# Patient Record
Sex: FEMALE | Race: BLACK OR AFRICAN AMERICAN | NOT HISPANIC OR LATINO | Employment: OTHER | URBAN - METROPOLITAN AREA
[De-identification: names, ages, dates, MRNs, and addresses within clinical notes are randomized per-mention and may not be internally consistent; named-entity substitution may affect disease eponyms.]

---

## 2018-11-27 ENCOUNTER — HOSPITAL ENCOUNTER (EMERGENCY)
Facility: HOSPITAL | Age: 68
Discharge: HOME | End: 2018-11-27
Attending: EMERGENCY MEDICINE | Admitting: EMERGENCY MEDICINE
Payer: MEDICARE

## 2018-11-27 ENCOUNTER — APPOINTMENT (EMERGENCY)
Dept: RADIOLOGY | Facility: HOSPITAL | Age: 68
End: 2018-11-27
Payer: MEDICARE

## 2018-11-27 VITALS
HEIGHT: 62 IN | WEIGHT: 164 LBS | HEART RATE: 65 BPM | DIASTOLIC BLOOD PRESSURE: 74 MMHG | SYSTOLIC BLOOD PRESSURE: 176 MMHG | RESPIRATION RATE: 19 BRPM | TEMPERATURE: 98.6 F | OXYGEN SATURATION: 99 % | BODY MASS INDEX: 30.18 KG/M2

## 2018-11-27 DIAGNOSIS — I49.8 SINUS ARRHYTHMIA: Primary | ICD-10-CM

## 2018-11-27 DIAGNOSIS — R05.9 COUGH: ICD-10-CM

## 2018-11-27 DIAGNOSIS — J06.9 UPPER RESPIRATORY INFECTION: ICD-10-CM

## 2018-11-27 LAB
ANION GAP SERPL CALCULATED.3IONS-SCNC: 7 MMOL/L (ref 4–13)
APTT PPP: 27 SECONDS (ref 26–38)
ATRIAL RATE: 60 BPM
BASOPHILS # BLD AUTO: 0.04 THOUSANDS/ΜL (ref 0–0.1)
BASOPHILS NFR BLD AUTO: 1 % (ref 0–1)
BUN SERPL-MCNC: 14 MG/DL (ref 5–25)
CALCIUM SERPL-MCNC: 9.3 MG/DL (ref 8.3–10.1)
CHLORIDE SERPL-SCNC: 106 MMOL/L (ref 100–108)
CO2 SERPL-SCNC: 29 MMOL/L (ref 21–32)
CREAT SERPL-MCNC: 0.7 MG/DL (ref 0.6–1.3)
EOSINOPHIL # BLD AUTO: 0.2 THOUSAND/ΜL (ref 0–0.61)
EOSINOPHIL NFR BLD AUTO: 4 % (ref 0–6)
ERYTHROCYTE [DISTWIDTH] IN BLOOD BY AUTOMATED COUNT: 13.2 % (ref 11.6–15.1)
GFR SERPL CREATININE-BSD FRML MDRD: 102 ML/MIN/1.73SQ M
GLUCOSE SERPL-MCNC: 113 MG/DL (ref 65–140)
HCT VFR BLD AUTO: 40.4 % (ref 34.8–46.1)
HGB BLD-MCNC: 13.1 G/DL (ref 11.5–15.4)
IMM GRANULOCYTES # BLD AUTO: 0.02 THOUSAND/UL (ref 0–0.2)
IMM GRANULOCYTES NFR BLD AUTO: 0 % (ref 0–2)
INR PPP: 0.99 (ref 0.86–1.17)
LIPASE SERPL-CCNC: 180 U/L (ref 73–393)
LYMPHOCYTES # BLD AUTO: 1.66 THOUSANDS/ΜL (ref 0.6–4.47)
LYMPHOCYTES NFR BLD AUTO: 30 % (ref 14–44)
MCH RBC QN AUTO: 29 PG (ref 26.8–34.3)
MCHC RBC AUTO-ENTMCNC: 32.4 G/DL (ref 31.4–37.4)
MCV RBC AUTO: 89 FL (ref 82–98)
MONOCYTES # BLD AUTO: 0.46 THOUSAND/ΜL (ref 0.17–1.22)
MONOCYTES NFR BLD AUTO: 8 % (ref 4–12)
NEUTROPHILS # BLD AUTO: 3.25 THOUSANDS/ΜL (ref 1.85–7.62)
NEUTS SEG NFR BLD AUTO: 57 % (ref 43–75)
NRBC BLD AUTO-RTO: 0 /100 WBCS
NT-PROBNP SERPL-MCNC: 173 PG/ML
P AXIS: 54 DEGREES
PLATELET # BLD AUTO: 171 THOUSANDS/UL (ref 149–390)
PMV BLD AUTO: 12.6 FL (ref 8.9–12.7)
POTASSIUM SERPL-SCNC: 4.1 MMOL/L (ref 3.5–5.3)
PR INTERVAL: 146 MS
PROTHROMBIN TIME: 13 SECONDS (ref 11.8–14.2)
QRS AXIS: 1 DEGREES
QRSD INTERVAL: 92 MS
QT INTERVAL: 412 MS
QTC INTERVAL: 431 MS
RBC # BLD AUTO: 4.52 MILLION/UL (ref 3.81–5.12)
SODIUM SERPL-SCNC: 142 MMOL/L (ref 136–145)
T WAVE AXIS: 27 DEGREES
TROPONIN I SERPL-MCNC: <0.02 NG/ML
VENTRICULAR RATE: 66 BPM
WBC # BLD AUTO: 5.63 THOUSAND/UL (ref 4.31–10.16)

## 2018-11-27 PROCEDURE — 71046 X-RAY EXAM CHEST 2 VIEWS: CPT

## 2018-11-27 PROCEDURE — 85610 PROTHROMBIN TIME: CPT | Performed by: EMERGENCY MEDICINE

## 2018-11-27 PROCEDURE — 85025 COMPLETE CBC W/AUTO DIFF WBC: CPT | Performed by: EMERGENCY MEDICINE

## 2018-11-27 PROCEDURE — 99285 EMERGENCY DEPT VISIT HI MDM: CPT

## 2018-11-27 PROCEDURE — 83690 ASSAY OF LIPASE: CPT | Performed by: EMERGENCY MEDICINE

## 2018-11-27 PROCEDURE — 36415 COLL VENOUS BLD VENIPUNCTURE: CPT | Performed by: EMERGENCY MEDICINE

## 2018-11-27 PROCEDURE — 93005 ELECTROCARDIOGRAM TRACING: CPT

## 2018-11-27 PROCEDURE — 84484 ASSAY OF TROPONIN QUANT: CPT | Performed by: EMERGENCY MEDICINE

## 2018-11-27 PROCEDURE — 93010 ELECTROCARDIOGRAM REPORT: CPT | Performed by: INTERNAL MEDICINE

## 2018-11-27 PROCEDURE — 83880 ASSAY OF NATRIURETIC PEPTIDE: CPT | Performed by: EMERGENCY MEDICINE

## 2018-11-27 PROCEDURE — 80048 BASIC METABOLIC PNL TOTAL CA: CPT | Performed by: EMERGENCY MEDICINE

## 2018-11-27 PROCEDURE — 85730 THROMBOPLASTIN TIME PARTIAL: CPT | Performed by: EMERGENCY MEDICINE

## 2018-11-27 RX ORDER — AZITHROMYCIN 250 MG/1
500 TABLET, FILM COATED ORAL ONCE
Status: COMPLETED | OUTPATIENT
Start: 2018-11-27 | End: 2018-11-27

## 2018-11-27 RX ORDER — AZITHROMYCIN 250 MG/1
250 TABLET, FILM COATED ORAL EVERY 24 HOURS
Qty: 4 TABLET | Refills: 0 | Status: SHIPPED | OUTPATIENT
Start: 2018-11-27 | End: 2018-12-01

## 2018-11-27 RX ADMIN — AZITHROMYCIN 500 MG: 250 TABLET, FILM COATED ORAL at 18:58

## 2018-11-27 NOTE — DISCHARGE INSTRUCTIONS
Acute Cough   WHAT YOU NEED TO KNOW:   An acute cough can last up to 3 weeks  Common causes of an acute cough include a cold, allergies, or a lung infection  DISCHARGE INSTRUCTIONS:   Return to the emergency department if:   · You have trouble breathing or feel short of breath  · You cough up blood, or you see blood in your mucus  · You faint or feel weak or dizzy  · You have chest pain when you cough or take a deep breath  · You have new wheezing  Contact your healthcare provider if:   · You have a fever  · Your cough lasts longer than 4 weeks  · Your symptoms do not improve with treatment  · You have questions or concerns about your condition or care  Medicines:   · Medicines  may be needed to stop the cough, decrease swelling in your airways, or help open your airways  Medicine may also be given to help you cough up mucus  Ask your healthcare provider what over-the-counter medicines you can take  If you have an infection caused by bacteria, you may need antibiotics  · Take your medicine as directed  Contact your healthcare provider if you think your medicine is not helping or if you have side effects  Tell him or her if you are allergic to any medicine  Keep a list of the medicines, vitamins, and herbs you take  Include the amounts, and when and why you take them  Bring the list or the pill bottles to follow-up visits  Carry your medicine list with you in case of an emergency  Manage your symptoms:   · Do not smoke and stay away from others who smoke  Nicotine and other chemicals in cigarettes and cigars can cause lung damage and make your cough worse  Ask your healthcare provider for information if you currently smoke and need help to quit  E-cigarettes or smokeless tobacco still contain nicotine  Talk to your healthcare provider before you use these products  · Drink extra liquids as directed  Liquids will help thin and loosen mucus so you can cough it up   Liquids will also help prevent dehydration  Examples of good liquids to drink include water, fruit juice, and broth  Do not drink liquids that contain caffeine  Caffeine can increase your risk for dehydration  Ask your healthcare provider how much liquid to drink each day  · Rest as directed  Do not do activities that make your cough worse, such as exercise  · Use a humidifier or vaporizer  Use a cool mist humidifier or a vaporizer to increase air moisture in your home  This may make it easier for you to breathe and help decrease your cough  · Eat 2 to 5 mL of honey 2 times each day  Honey can help thin mucus and decrease your cough  · Use cough drops or lozenges  These can help decrease throat irritation and your cough  Follow up with your healthcare provider as directed:  Write down your questions so you remember to ask them during your visits  © 2017 [employer] Information is for End User's use only and may not be sold, redistributed or otherwise used for commercial purposes  All illustrations and images included in CareNotes® are the copyrighted property of A D [employer] M [employer]  or Willam Sahu  The above information is an  only  It is not intended as medical advice for individual conditions or treatments  Talk to your doctor, nurse or pharmacist before following any medical regimen to see if it is safe and effective for you

## 2018-11-27 NOTE — ED PROVIDER NOTES
History  Chief Complaint   Patient presents with    Chest Pain     Pt presents to ER as a walk in from home for cough / cold symptoms x3 weeks associated with chest pains that started x1 week  Productive cough - clear / yellow  Pain is midsternal with radiation to back discomfort as an ache  Denies and n/v/d, sob  77-year-old female presenting for evaluation of 3 weeks of URI symptoms  Pt report that initially she was having fevers, however these have resolved  She reports rhinorrhea, congestion and cough  She reports that she has been unable to get rid of these symptoms  She has tried over-the-counter cold medications  She went to an urgent care today who referred to the ED for further evaluation  She reports that she has had some intermittent chest discomfort, occurs randomly, last episode was about 1 week ago  No current CP  Not associated with coughing  Denies any shortness of breath, abdominal pain, nausea, vomiting, changes in stool, urinary complaints or rash  No known CAD  Patient does have a cardiac murmur and follows with Cardiology regularly  She reports that she had a stress test about 2-3 years ago  Patient reports that she had an echo done about 1 month ago, does not know the reports of this, but the cardiologist was not concerned  Patient is a type 2 diabetic, on metformin, no insulin  No other medical problems  Prior smoker, quit 25 years ago  A/P:  77-year-old female with URI symptoms/cough, will get CXR to rule out pneumonia, CBC to assess for leukocytosis/anemia, BMP to assess renal function/electrolytes, lipase to rule out pancreatitis, troponin to evaluate for ischemia, EKG to rule out STEMI/arrhythmia            None       Past Medical History:   Diagnosis Date    Diabetes mellitus ([facility] [address] )     Hypertension        History reviewed  No pertinent surgical history  No family history on file  I have reviewed and agree with the history as documented      Social History Substance Use Topics    Smoking status: Former Smoker     Quit date: 11/27/1995    Smokeless tobacco: Never Used    Alcohol use 0 6 oz/week     1 Glasses of wine per week        Review of Systems   Constitutional: Negative for chills, fever and unexpected weight change  HENT: Positive for congestion  Negative for ear pain, rhinorrhea and sore throat  Eyes: Negative for pain and visual disturbance  Respiratory: Positive for cough  Negative for shortness of breath  Cardiovascular: Negative for leg swelling  Gastrointestinal: Negative for abdominal pain, constipation, diarrhea, nausea and vomiting  Endocrine: Negative for polydipsia, polyphagia and polyuria  Genitourinary: Negative for dysuria, frequency, hematuria and urgency  Musculoskeletal: Negative for back pain, myalgias and neck pain  Skin: Negative for color change and rash  Allergic/Immunologic: Negative for environmental allergies and immunocompromised state  Neurological: Negative for dizziness, weakness, light-headedness, numbness and headaches  Hematological: Negative for adenopathy  Does not bruise/bleed easily  Psychiatric/Behavioral: Negative for agitation and confusion  All other systems reviewed and are negative  Physical Exam  Physical Exam   Constitutional: She is oriented to person, place, and time  She appears well-developed and well-nourished  HENT:   Head: Normocephalic and atraumatic  Nose: Nose normal    Mouth/Throat: Oropharynx is clear and moist    Eyes: Conjunctivae and EOM are normal    Neck: Normal range of motion  Neck supple  Cardiovascular: Normal rate, regular rhythm, normal heart sounds and intact distal pulses  Pulmonary/Chest: Effort normal and breath sounds normal  No stridor  No respiratory distress  She has no wheezes  She has no rales  She exhibits no tenderness  Abdominal: Soft  She exhibits no distension  There is no tenderness  There is no rebound and no guarding     No back/CVA ttp   Musculoskeletal: She exhibits no edema or deformity  No calf swelling/ttp   Neurological: She is alert and oriented to person, place, and time  She exhibits normal muscle tone  Coordination normal    Skin: Skin is warm and dry  No rash noted  Psychiatric: She has a normal mood and affect  Her behavior is normal    Nursing note and vitals reviewed  Vital Signs  ED Triage Vitals   Temperature Pulse Respirations Blood Pressure SpO2   11/27/18 1630 11/27/18 1630 11/27/18 1630 11/27/18 1630 11/27/18 1630   98 6 °F (37 °C) 72 18 150/83 98 %      Temp Source Heart Rate Source Patient Position - Orthostatic VS BP Location FiO2 (%)   11/27/18 1630 11/27/18 1830 11/27/18 1630 11/27/18 1630 --   Oral Monitor Sitting Left arm       Pain Score       11/27/18 1630       5           Vitals:    11/27/18 1630 11/27/18 1830   BP: 150/83 (!) 176/74   Pulse: 72 65   Patient Position - Orthostatic VS: Sitting Lying       Visual Acuity      ED Medications  Medications   azithromycin (ZITHROMAX) tablet 500 mg (500 mg Oral Given 11/27/18 1858)       Diagnostic Studies  Results Reviewed     Procedure Component Value Units Date/Time    Basic metabolic panel [[id number]] Collected:  11/27/18 1800    Lab Status:  Final result Specimen:  Blood from Arm, Right Updated:  11/27/18 1829     Sodium 142 mmol/L      Potassium 4 1 mmol/L      Chloride 106 mmol/L      CO2 29 mmol/L      ANION GAP 7 mmol/L      BUN 14 mg/dL      Creatinine 0 70 mg/dL      Glucose 113 mg/dL      Calcium 9 3 mg/dL      eGFR 102 ml/min/1 73sq m     Narrative:         National Kidney Disease Education Program recommendations are as follows:  GFR calculation is accurate only with a steady state creatinine  Chronic Kidney disease less than 60 ml/min/1 73 sq  meters  Kidney failure less than 15 ml/min/1 73 sq  meters      Lipase [[id number]]  (Normal) Collected:  11/27/18 1800    Lab Status:  Final result Specimen:  Blood from Arm, Right Updated:  11/27/18 1829     Lipase 180 u/L     NT-BNP PRO [[id number]]  (Abnormal) Collected:  11/27/18 1800    Lab Status:  Final result Specimen:  Blood from Arm, Right Updated:  11/27/18 1829     NT-proBNP 173 (H) pg/mL     Troponin I [[id number]]  (Normal) Collected:  11/27/18 1800    Lab Status:  Final result Specimen:  Blood from Arm, Right Updated:  11/27/18 1826     Troponin I <0 02 ng/mL     APTT [[id number]]  (Normal) Collected:  11/27/18 1800    Lab Status:  Final result Specimen:  Blood from Arm, Right Updated:  11/27/18 1818     PTT 27 seconds     Protime-INR [[id number]]  (Normal) Collected:  11/27/18 1800    Lab Status:  Final result Specimen:  Blood from Arm, Right Updated:  11/27/18 1818     Protime 13 0 seconds      INR 0 99    CBC and differential [[id number]] Collected:  11/27/18 1800    Lab Status:  Final result Specimen:  Blood from Arm, Right Updated:  11/27/18 1808     WBC 5 63 Thousand/uL      RBC 4 52 Million/uL      Hemoglobin 13 1 g/dL      Hematocrit 40 4 %      MCV 89 fL      MCH 29 0 pg      MCHC 32 4 g/dL      RDW 13 2 %      MPV 12 6 fL      Platelets 939 Thousands/uL      nRBC 0 /100 WBCs      Neutrophils Relative 57 %      Immat GRANS % 0 %      Lymphocytes Relative 30 %      Monocytes Relative 8 %      Eosinophils Relative 4 %      Basophils Relative 1 %      Neutrophils Absolute 3 25 Thousands/µL      Immature Grans Absolute 0 02 Thousand/uL      Lymphocytes Absolute 1 66 Thousands/µL      Monocytes Absolute 0 46 Thousand/µL      Eosinophils Absolute 0 20 Thousand/µL      Basophils Absolute 0 04 Thousands/µL                  XR chest 2 views   Final Result by Alla Sahu MD (11/27 2033)      No acute cardiopulmonary disease              Workstation performed: [id number]                    Procedures  ECG 12 Lead Documentation  Date/Time: 11/27/2018 5:56 PM  Performed by: Rafat Bruno  Authorized by: Lydia MAHMOOD     Indications / Diagnosis:  CP  ECG reviewed by me, the ED Provider: yes    Patient location:  ED  Previous ECG:     Previous ECG:  Unavailable  Rate:     ECG rate:  66  Rhythm:     Rhythm: sinus rhythm    Ectopy:     Ectopy: PAC    QRS:     QRS axis:  Normal  Conduction:     Conduction: normal    ST segments:     ST segments:  Normal  T waves:     T waves: flattening      Flattening:  V1, V2 and III           Phone Contacts  ED Phone Contact    ED Course         HEART Risk Score      Most Recent Value   History  0 Filed at: 11/28/2018 0141   ECG  0 Filed at: 11/28/2018 0141   Age  2 Filed at: 11/28/2018 0141   Risk Factors  1 Filed at: 11/28/2018 0141   Troponin  0 Filed at: 11/28/2018 0141   Heart Score Risk Calculator   History  0 Filed at: 11/28/2018 0141   ECG  0 Filed at: 11/28/2018 0141   Age  2 Filed at: 11/28/2018 0141   Risk Factors  1 Filed at: 11/28/2018 0141   Troponin  0 Filed at: 11/28/2018 0141   HEART Score  3 Filed at: 11/28/2018 0141   HEART Score  3 Filed at: 11/28/2018 0141                            MDM  Number of Diagnoses or Management Options  Cough:   Sinus arrhythmia:   Upper respiratory infection:   Diagnosis management comments: 76 yo F with URI sx, given duration of sx, prescribed Z-henry   Discussed with pt symptomatic management, strict return precautions and PCP f/u       Amount and/or Complexity of Data Reviewed  Clinical lab tests: ordered and reviewed  Tests in the radiology section of CPT®: ordered and reviewed  Tests in the medicine section of CPT®: ordered and reviewed      CritCare Time    Disposition  Final diagnoses:   Sinus arrhythmia   Upper respiratory infection   Cough     Time reflects when diagnosis was documented in both MDM as applicable and the Disposition within this note     Time User Action Codes Description Comment    11/27/2018  6:42 PM Viva Alter A Add [I49 8] Sinus arrhythmia     11/27/2018  6:42 PM Viva Alter A Add [J06 9] Upper respiratory infection     11/27/2018  6:43 PM Viva Alter A Add [R05] Cough       ED Disposition     ED Disposition Condition Comment    Discharge  Illene Furbish discharge to home/self care  Condition at discharge: Stable        Follow-up Information     Follow up With Specialties Details Why Contact [facility], DO    [address] [place] [address]  [phone]          Please take antibiotics as prescribed  Return to ED if you experience any new or concerning symptoms  Please call to make appointment to follow up with your PCP          Discharge Medication List as of 11/27/2018  6:45 PM      START taking these medications    Details   azithromycin (ZITHROMAX) 250 mg tablet Take 1 tablet (250 mg total) by mouth every 24 hours for 4 days, Starting Tue 11/27/2018, Until Sat 12/1/2018, Print           No discharge procedures on file      ED Provider  Electronically Signed by           Eun Olson DO  11/28/18 5511

## 2019-02-22 ENCOUNTER — TRANSCRIBE ORDERS (OUTPATIENT)
Dept: ADMINISTRATIVE | Facility: HOSPITAL | Age: 69
End: 2019-02-22

## 2019-02-22 DIAGNOSIS — R20.2 PARESTHESIA: Primary | ICD-10-CM

## 2019-05-22 ENCOUNTER — HOSPITAL ENCOUNTER (OUTPATIENT)
Dept: NEUROLOGY | Facility: AMBULATORY SURGERY CENTER | Age: 69
Discharge: HOME | End: 2019-05-22
Payer: MEDICARE

## 2019-05-22 DIAGNOSIS — R20.2 PARESTHESIA: ICD-10-CM

## 2019-05-22 PROCEDURE — 95910 NRV CNDJ TEST 7-8 STUDIES: CPT | Performed by: PSYCHIATRY & NEUROLOGY

## 2019-05-22 PROCEDURE — 95886 MUSC TEST DONE W/N TEST COMP: CPT | Performed by: PSYCHIATRY & NEUROLOGY

## 2020-02-06 ENCOUNTER — OFFICE VISIT (OUTPATIENT)
Dept: DERMATOLOGY | Facility: CLINIC | Age: 70
End: 2020-02-06
Payer: MEDICARE

## 2020-02-06 DIAGNOSIS — L28.0 LICHEN SIMPLEX CHRONICUS: Primary | ICD-10-CM

## 2020-02-06 DIAGNOSIS — Z13.89 SCREENING FOR SKIN CONDITION: ICD-10-CM

## 2020-02-06 DIAGNOSIS — L82.1 SEBORRHEIC KERATOSIS: ICD-10-CM

## 2020-02-06 PROCEDURE — 99203 OFFICE O/P NEW LOW 30 MIN: CPT | Performed by: DERMATOLOGY

## 2020-02-06 RX ORDER — DESONIDE 0.5 MG/G
OINTMENT TOPICAL 2 TIMES DAILY
Qty: 15 G | Refills: 1 | Status: SHIPPED | OUTPATIENT
Start: 2020-02-06

## 2020-02-06 RX ORDER — LISINOPRIL 5 MG/1
TABLET ORAL
COMMUNITY
Start: 2020-01-20

## 2020-02-06 NOTE — PATIENT INSTRUCTIONS
Lichen simplex chronicus probably itchy area that is probably triggered from nasal allergies will go ahead treat her with desonide ointment see if this will help if no improvement will consider other options  Seborrheic Keratosis  Patient reasurred these are normal growths we acquire with age no treatment needed    Screening for Dermatologic Disorders: Nothing else of concern noted on complete exam follow up prn

## 2020-02-06 NOTE — PROGRESS NOTES
[facility] DERMATOLOGY  [address] [place] [id number]  [phone]  [phone]     MRN: [id number] : 1950  Encounter: [id number]  Patient Information: Nanci Rawls  Chief complaint:  Itching area nose and overall checkup    History of present illness:  49-year-old female presents for concerns regarding itching area on the nasal rim the patient does have allergies that are seasonal environmental   Also on concerned regarding several new spots that have developed on her skin  Past Medical History:   Diagnosis Date    [facility] mellitus ([facility] [address] )     Hypertension      No past surgical history on file  Social History   Social History     Substance and Sexual Activity   Alcohol Use Yes    Alcohol/week: 1 0 standard drinks    Types: 1 Glasses of wine per week     Social History     Substance and Sexual Activity   Drug Use Not on file     Social History     Tobacco Use   Smoking Status Former Smoker    Last attempt to quit: 1995    Years since quittin 2   Smokeless Tobacco Never Used     No family history on file  Meds/Allergies   Allergies   Allergen Reactions    Januvia [Sitagliptin] Hives       Meds:  Prior to Admission medications    Medication Sig Start Date End Date Taking?  Authorizing Provider   Cetirizine HCl (ZYRTEC ALLERGY) 10 MG CAPS Take 10 mg by mouth daily 20  Yes Historical Provider, MD   lisinopril (ZESTRIL) 5 mg tablet  20  Yes Historical Provider, MD   metFORMIN (GLUCOPHAGE) 500 mg tablet Take 500 mg by mouth 20  Yes Historical Provider, MD       Subjective:     Review of Systems:    General: negative for - chills, fatigue, fever,  weight gain or weight loss  Psychological: negative for - anxiety, behavioral disorder, concentration difficulties, decreased libido, depression, irritability, memory difficulties, mood swings, sleep disturbances or suicidal ideation  ENT: negative for - hearing difficulties , nasal congestion, nasal discharge, oral lesions, sinus pain, sneezing, sore throat  Allergy and Immunology: negative for - hives, insect bite sensitivity,  Hematological and Lymphatic: negative for - bleeding problems, blood clots,bruising, swollen lymph nodes  Endocrine: negative for - hair pattern changes, hot flashes, malaise/lethargy, mood swings, palpitations, polydipsia/polyuria, skin changes, temperature intolerance or unexpected weight change  Respiratory: negative for - cough, hemoptysis, orthopnea, shortness of breath, or wheezing  Cardiovascular: negative for - chest pain, dyspnea on exertion, edema,  Gastrointestinal: negative for - abdominal pain, nausea/vomiting  Genito-Urinary: negative for - dysuria, incontinence, irregular/heavy menses or urinary frequency/urgency  Musculoskeletal: negative for - gait disturbance, joint pain, joint stiffness, joint swelling, muscle pain, muscular weakness  Dermatological:  As in HPI  Neurological: negative for confusion, dizziness, headaches, impaired coordination/balance, memory loss, numbness/tingling, seizures, speech problems, tremors or weakness       Objective: There were no vitals taken for this visit  Physical Exam:    General Appearance:    Alert, cooperative, no distress   Head:    Normocephalic, without obvious abnormality, atraumatic           Skin:   A full skin exam was performed including scalp, head scalp, eyes, ears, nose, lips, neck, chest, axilla, abdomen, back, buttocks, bilateral upper extremities, bilateral lower extremities, hands, feet, fingers, toes, fingernails, and toenails scaling erythematous well-demarcated area noted on the ala rim normal keratotic papules greasy stuck appearance nothing else atypical noted on exam     Assessment:     1  Lichen simplex chronicus     2  Seborrheic keratosis     3   Screening for skin condition           Plan:   Lichen simplex chronicus probably itchy area that is probably triggered from nasal allergies will go ahead treat her with desonide ointment see if this will help if no improvement will consider other options  Seborrheic Keratosis  Patient reasurred these are normal growths we acquire with age no treatment needed  Screening for Dermatologic Disorders: Nothing else of concern noted on complete exam follow up in 1 year       Edna Melgar MD  2/6/2020,8:45 AM    Portions of the record may have been created with voice recognition software   Occasional wrong word or "sound a like" substitutions may have occurred due to the inherent limitations of voice recognition software   Read the chart carefully and recognize, using context, where substitutions have occurred

## 2020-03-18 ENCOUNTER — OFFICE VISIT (OUTPATIENT)
Dept: GASTROENTEROLOGY | Facility: CLINIC | Age: 70
End: 2020-03-18
Payer: MEDICARE

## 2020-03-18 VITALS
HEART RATE: 58 BPM | WEIGHT: 158.8 LBS | BODY MASS INDEX: 29.98 KG/M2 | DIASTOLIC BLOOD PRESSURE: 78 MMHG | HEIGHT: 61 IN | SYSTOLIC BLOOD PRESSURE: 110 MMHG

## 2020-03-18 DIAGNOSIS — K59.00 CONSTIPATION, UNSPECIFIED CONSTIPATION TYPE: Primary | ICD-10-CM

## 2020-03-18 PROCEDURE — 99203 OFFICE O/P NEW LOW 30 MIN: CPT | Performed by: PHYSICIAN ASSISTANT

## 2020-03-18 NOTE — PROGRESS NOTES
[facility] - Outpatient Consultation  He Lacy 68 y o  female MRN: [id number]  Encounter: [id number]          ASSESSMENT AND PLAN:      1  Constipation, unspecified constipation type  Mild  New over the past 2 months  Timing seems to coincide with when she started her antihistamine    Recommended increase dietary fiber and start a supplement  Ensure 64oz of water daily  Increase activity level  Use Colace 100mg at bedtime    She had a negative cologuard in September  She has never had a colonoscopy and prefers not have one    ______________________________________________________________________    HPI:  78-year-old female presents for evaluation constipation  This is new over the past 2 months and mild  She reports that she has a bowel movement every 1-2 days  She reports that sometimes she feels as if she has to go cannot pass stool  She has some mild lower abdominal pain and bloating  She denies any rectal bleeding or unexpected weight loss  She has been taking a stool softener occasionally which helps  She eats plenty of vegetables but admits she does not eat many grains or fruits as she is diabetic  Blood sugars are mostly well controlled although her last hemoglobin A1c was 7 9  She admits that her symptom onset seem to coincide with when she started her allergy pill otherwise her other medications are chronic  She has never had a colonoscopy  It was recommended but she refused due to her concerns over anesthesia  She had a negative cologuard in September  REVIEW OF SYSTEMS:    CONSTITUTIONAL: Denies any fever, chills, rigors, and weight loss  HEENT: No earache or tinnitus  Denies hearing loss or visual disturbances  CARDIOVASCULAR: No chest pain or palpitations  RESPIRATORY: Denies any cough, hemoptysis, shortness of breath or dyspnea on exertion  GASTROINTESTINAL: As noted in the History of Present Illness  GENITOURINARY: No problems with urination  Denies any hematuria or dysuria  NEUROLOGIC: No dizziness or vertigo, denies headaches  MUSCULOSKELETAL: Denies any muscle or joint pain  SKIN: Denies skin rashes or itching  ENDOCRINE: Denies excessive thirst  Denies intolerance to heat or cold  PSYCHOSOCIAL: Denies depression or anxiety  Denies any recent memory loss  Historical Information   Past Medical History:   Diagnosis Date    Diabetes mellitus ([facility] [address] )     Hypertension      History reviewed  No pertinent surgical history  Social History   Social History     Substance and Sexual Activity   Alcohol Use Yes    Alcohol/week: 1 0 standard drinks    Types: 1 Glasses of wine per week     Social History     Substance and Sexual Activity   Drug Use Never     Social History     Tobacco Use   Smoking Status Former Smoker    Last attempt to quit: 1995    Years since quittin 3   Smokeless Tobacco Never Used     History reviewed  No pertinent family history  Meds/Allergies       Current Outpatient Medications:     Cetirizine HCl (ZYRTEC ALLERGY) 10 MG CAPS    desonide (DESOWEN) 0 05 % ointment    lisinopril (ZESTRIL) 5 mg tablet    metFORMIN (GLUCOPHAGE) 500 mg tablet    Allergies   Allergen Reactions    Januvia [Sitagliptin] Hives           Objective     Blood pressure 110/78, pulse 58, height 5' 1" (1 549 m), weight 72 kg (158 lb 12 8 oz)  Body mass index is 30 kg/m²  PHYSICAL EXAM:      General Appearance:   Alert, cooperative, no distress   HEENT:   Normocephalic, atraumatic, anicteric      Neck:  Supple, symmetrical, trachea midline   Lungs:   Clear to auscultation bilaterally; no rales, rhonchi or wheezing; respirations unlabored    Heart[de-identified]   Regular rate and rhythm; no murmur, rub, or gallop     Abdomen:   Soft, non-tender, non-distended; normal bowel sounds; no masses, no organomegaly    Genitalia:   Deferred    Rectal:   Deferred    Extremities:  No cyanosis, clubbing or edema    Pulses:  2+ and symmetric    Skin:  No jaundice, rashes, or lesions    Lymph nodes:  No palpable cervical lymphadenopathy        Lab Results:   No visits with results within 1 Day(s) from this visit  Latest known visit with results is:   Admission on 11/27/2018, Discharged on 11/27/2018   Component Date Value    WBC 11/27/2018 5 63     RBC 11/27/2018 4 52     Hemoglobin 11/27/2018 13 1     Hematocrit 11/27/2018 40 4     MCV 11/27/2018 89     MCH 11/27/2018 29 0     MCHC 11/27/2018 32 4     RDW 11/27/2018 13 2     MPV 11/27/2018 12 6     Platelets 75/56/8952 171     nRBC 11/27/2018 0     Neutrophils Relative 11/27/2018 57     Immat GRANS % 11/27/2018 0     Lymphocytes Relative 11/27/2018 30     Monocytes Relative 11/27/2018 8     Eosinophils Relative 11/27/2018 4     Basophils Relative 11/27/2018 1     Neutrophils Absolute 11/27/2018 3 25     Immature Grans Absolute 11/27/2018 0 02     Lymphocytes Absolute 11/27/2018 1 66     Monocytes Absolute 11/27/2018 0 46     Eosinophils Absolute 11/27/2018 0 20     Basophils Absolute 11/27/2018 0 04     Sodium 11/27/2018 142     Potassium 11/27/2018 4 1     Chloride 11/27/2018 106     CO2 11/27/2018 29     ANION GAP 11/27/2018 7     BUN 11/27/2018 14     Creatinine 11/27/2018 0 70     Glucose 11/27/2018 113     Calcium 11/27/2018 9 3     eGFR 11/27/2018 102     Lipase 11/27/2018 180     PTT 11/27/2018 27     Protime 11/27/2018 13 0     INR 11/27/2018 0 99     Troponin I 11/27/2018 <0 02     NT-proBNP 11/27/2018 173*    Ventricular Rate 11/27/2018 66     Atrial Rate 11/27/2018 60     AR Interval 11/27/2018 146     QRSD Interval 11/27/2018 92     QT Interval 11/27/2018 412     QTC Interval 11/27/2018 431     P Axis 11/27/2018 54     QRS Axis 11/27/2018 1     T Wave Axis 11/27/2018 27          Radiology Results:   No results found

## 2022-09-02 ENCOUNTER — HOSPITAL ENCOUNTER (INPATIENT)
Facility: HOSPITAL | Age: 72
LOS: 3 days | Discharge: HOME | DRG: 158 | End: 2022-09-06
Attending: EMERGENCY MEDICINE | Admitting: INTERNAL MEDICINE
Payer: MEDICARE

## 2022-09-02 ENCOUNTER — APPOINTMENT (OUTPATIENT)
Dept: RADIOLOGY | Facility: HOSPITAL | Age: 72
DRG: 158 | End: 2022-09-02
Payer: MEDICARE

## 2022-09-02 DIAGNOSIS — K08.89 PAIN, DENTAL: ICD-10-CM

## 2022-09-02 DIAGNOSIS — R07.9 CHEST PAIN: Primary | ICD-10-CM

## 2022-09-02 DIAGNOSIS — R77.8 ELEVATED TROPONIN: ICD-10-CM

## 2022-09-02 LAB
2HR DELTA HS TROPONIN: 0 NG/L
4HR DELTA HS TROPONIN: 1 NG/L
ALBUMIN SERPL BCP-MCNC: 3.5 G/DL (ref 3.5–5)
ALP SERPL-CCNC: 79 U/L (ref 46–116)
ALT SERPL W P-5'-P-CCNC: 18 U/L (ref 12–78)
ANION GAP SERPL CALCULATED.3IONS-SCNC: 8 MMOL/L (ref 4–13)
AST SERPL W P-5'-P-CCNC: 13 U/L (ref 5–45)
BASOPHILS # BLD AUTO: 0.04 THOUSANDS/ΜL (ref 0–0.1)
BASOPHILS NFR BLD AUTO: 1 % (ref 0–1)
BILIRUB SERPL-MCNC: 0.43 MG/DL (ref 0.2–1)
BUN SERPL-MCNC: 13 MG/DL (ref 5–25)
CALCIUM SERPL-MCNC: 9.1 MG/DL (ref 8.3–10.1)
CARDIAC TROPONIN I PNL SERPL HS: 60 NG/L
CARDIAC TROPONIN I PNL SERPL HS: 60 NG/L
CARDIAC TROPONIN I PNL SERPL HS: 61 NG/L
CHLORIDE SERPL-SCNC: 101 MMOL/L (ref 96–108)
CO2 SERPL-SCNC: 30 MMOL/L (ref 21–32)
CREAT SERPL-MCNC: 0.86 MG/DL (ref 0.6–1.3)
EOSINOPHIL # BLD AUTO: 0.18 THOUSAND/ΜL (ref 0–0.61)
EOSINOPHIL NFR BLD AUTO: 2 % (ref 0–6)
ERYTHROCYTE [DISTWIDTH] IN BLOOD BY AUTOMATED COUNT: 13.5 % (ref 11.6–15.1)
GFR SERPL CREATININE-BSD FRML MDRD: 67 ML/MIN/1.73SQ M
GLUCOSE SERPL-MCNC: 146 MG/DL (ref 65–140)
GLUCOSE SERPL-MCNC: 193 MG/DL (ref 65–140)
HCT VFR BLD AUTO: 41.9 % (ref 34.8–46.1)
HGB BLD-MCNC: 13.4 G/DL (ref 11.5–15.4)
IMM GRANULOCYTES # BLD AUTO: 0.02 THOUSAND/UL (ref 0–0.2)
IMM GRANULOCYTES NFR BLD AUTO: 0 % (ref 0–2)
LYMPHOCYTES # BLD AUTO: 1.34 THOUSANDS/ΜL (ref 0.6–4.47)
LYMPHOCYTES NFR BLD AUTO: 17 % (ref 14–44)
MCH RBC QN AUTO: 28.6 PG (ref 26.8–34.3)
MCHC RBC AUTO-ENTMCNC: 32 G/DL (ref 31.4–37.4)
MCV RBC AUTO: 90 FL (ref 82–98)
MONOCYTES # BLD AUTO: 0.69 THOUSAND/ΜL (ref 0.17–1.22)
MONOCYTES NFR BLD AUTO: 9 % (ref 4–12)
NEUTROPHILS # BLD AUTO: 5.62 THOUSANDS/ΜL (ref 1.85–7.62)
NEUTS SEG NFR BLD AUTO: 71 % (ref 43–75)
NRBC BLD AUTO-RTO: 0 /100 WBCS
PLATELET # BLD AUTO: 186 THOUSANDS/UL (ref 149–390)
PMV BLD AUTO: 12.7 FL (ref 8.9–12.7)
POTASSIUM SERPL-SCNC: 4.1 MMOL/L (ref 3.5–5.3)
PROT SERPL-MCNC: 7.7 G/DL (ref 6.4–8.4)
RBC # BLD AUTO: 4.68 MILLION/UL (ref 3.81–5.12)
SODIUM SERPL-SCNC: 139 MMOL/L (ref 135–147)
WBC # BLD AUTO: 7.89 THOUSAND/UL (ref 4.31–10.16)

## 2022-09-02 PROCEDURE — 99285 EMERGENCY DEPT VISIT HI MDM: CPT | Performed by: EMERGENCY MEDICINE

## 2022-09-02 PROCEDURE — 84484 ASSAY OF TROPONIN QUANT: CPT | Performed by: EMERGENCY MEDICINE

## 2022-09-02 PROCEDURE — 99285 EMERGENCY DEPT VISIT HI MDM: CPT

## 2022-09-02 PROCEDURE — 87040 BLOOD CULTURE FOR BACTERIA: CPT

## 2022-09-02 PROCEDURE — 93005 ELECTROCARDIOGRAM TRACING: CPT

## 2022-09-02 PROCEDURE — 80053 COMPREHEN METABOLIC PANEL: CPT | Performed by: EMERGENCY MEDICINE

## 2022-09-02 PROCEDURE — 84484 ASSAY OF TROPONIN QUANT: CPT

## 2022-09-02 PROCEDURE — 85025 COMPLETE CBC W/AUTO DIFF WBC: CPT | Performed by: EMERGENCY MEDICINE

## 2022-09-02 PROCEDURE — 82948 REAGENT STRIP/BLOOD GLUCOSE: CPT

## 2022-09-02 PROCEDURE — 99220 PR INITIAL OBSERVATION CARE/DAY 70 MINUTES: CPT

## 2022-09-02 PROCEDURE — 71045 X-RAY EXAM CHEST 1 VIEW: CPT

## 2022-09-02 PROCEDURE — 36415 COLL VENOUS BLD VENIPUNCTURE: CPT | Performed by: EMERGENCY MEDICINE

## 2022-09-02 RX ORDER — MORPHINE SULFATE 4 MG/ML
4 INJECTION, SOLUTION INTRAMUSCULAR; INTRAVENOUS EVERY 4 HOURS PRN
Status: DISCONTINUED | OUTPATIENT
Start: 2022-09-02 | End: 2022-09-04

## 2022-09-02 RX ORDER — KETOROLAC TROMETHAMINE 30 MG/ML
15 INJECTION, SOLUTION INTRAMUSCULAR; INTRAVENOUS EVERY 6 HOURS PRN
Status: DISPENSED | OUTPATIENT
Start: 2022-09-02 | End: 2022-09-04

## 2022-09-02 RX ORDER — INSULIN LISPRO 100 [IU]/ML
1-5 INJECTION, SOLUTION INTRAVENOUS; SUBCUTANEOUS
Status: DISCONTINUED | OUTPATIENT
Start: 2022-09-03 | End: 2022-09-06 | Stop reason: HOSPADM

## 2022-09-02 RX ORDER — ENOXAPARIN SODIUM 100 MG/ML
40 INJECTION SUBCUTANEOUS DAILY
Status: DISCONTINUED | OUTPATIENT
Start: 2022-09-03 | End: 2022-09-06 | Stop reason: HOSPADM

## 2022-09-02 RX ORDER — HYDRALAZINE HYDROCHLORIDE 20 MG/ML
5 INJECTION INTRAMUSCULAR; INTRAVENOUS EVERY 6 HOURS PRN
Status: DISCONTINUED | OUTPATIENT
Start: 2022-09-02 | End: 2022-09-06 | Stop reason: HOSPADM

## 2022-09-02 RX ORDER — ASPIRIN 81 MG/1
81 TABLET, CHEWABLE ORAL ONCE
Status: COMPLETED | OUTPATIENT
Start: 2022-09-02 | End: 2022-09-02

## 2022-09-02 RX ORDER — HYDRALAZINE HYDROCHLORIDE 20 MG/ML
5 INJECTION INTRAMUSCULAR; INTRAVENOUS ONCE
Status: DISCONTINUED | OUTPATIENT
Start: 2022-09-02 | End: 2022-09-06 | Stop reason: HOSPADM

## 2022-09-02 RX ORDER — ACETAMINOPHEN 325 MG/1
650 TABLET ORAL EVERY 6 HOURS PRN
Status: DISCONTINUED | OUTPATIENT
Start: 2022-09-02 | End: 2022-09-06 | Stop reason: HOSPADM

## 2022-09-02 RX ORDER — INSULIN LISPRO 100 [IU]/ML
1-5 INJECTION, SOLUTION INTRAVENOUS; SUBCUTANEOUS
Status: DISCONTINUED | OUTPATIENT
Start: 2022-09-02 | End: 2022-09-06 | Stop reason: HOSPADM

## 2022-09-02 RX ORDER — LISINOPRIL 5 MG/1
5 TABLET ORAL DAILY
Status: DISCONTINUED | OUTPATIENT
Start: 2022-09-03 | End: 2022-09-06 | Stop reason: HOSPADM

## 2022-09-02 RX ADMIN — SODIUM CHLORIDE 3 G: 9 INJECTION, SOLUTION INTRAVENOUS at 21:27

## 2022-09-02 RX ADMIN — KETOROLAC TROMETHAMINE 15 MG: 30 INJECTION, SOLUTION INTRAMUSCULAR at 22:51

## 2022-09-02 RX ADMIN — MORPHINE SULFATE 2 MG: 2 INJECTION, SOLUTION INTRAMUSCULAR; INTRAVENOUS at 21:36

## 2022-09-02 RX ADMIN — ASPIRIN 81 MG: 81 TABLET, CHEWABLE ORAL at 19:21

## 2022-09-02 RX ADMIN — HYDRALAZINE HYDROCHLORIDE 5 MG: 20 INJECTION INTRAMUSCULAR; INTRAVENOUS at 21:37

## 2022-09-02 NOTE — ASSESSMENT & PLAN NOTE
Lab Results   Component Value Date    HGBA1C 7 2 (H) 06/03/2022     ·   · Hold home metformin  · Correctional SSI  · Hypoglycemia protocol  · Diabetic diet

## 2022-09-02 NOTE — ASSESSMENT & PLAN NOTE
· /84  · Suspecting pain may be contributing   · Continue home lisinopril  · PRN IV hydralazine ordered   · Monitor BP per unit protocol

## 2022-09-02 NOTE — ASSESSMENT & PLAN NOTE
Patient reports on/off substernal chest pain w/o radiation for the past several days  Denies exertional or pleuritic component to the pain  Also reports left-sided mandibular pain, which she reports has been ongoing and has been given PO abx by outaptient dentist for possible dental abscess  Currently denies chest pain or other symptom/complaint      BP (!) 191/84 (BP Location: Left arm)   Pulse 92   Temp 99 1 °F (37 3 °C) (Oral)   Resp 16   Ht 5' 1" (1 549 m)   Wt 72 6 kg (160 lb)   SpO2 100%   BMI 30 23 kg/m²     · Reporting on/off substernal chest pain w/o radiation for the past several days   · Denies exertional or pleuritic component to pain  · Reports sometimes eating a large meal can cause the pain  · Currently chest pain free  · ELLY 4  · Troponin 60  · EKG NSR HR 81  · CXR wet read w/o acute abnormality   · ED spoke w/ cardiology who recommend obs and trop trending  · Tele monitoring  · Trend remainder of troponin/EKG

## 2022-09-02 NOTE — H&P
[address] [place] [address]  H&P- Sonali Hacking 1950, 66 y o  female MRN: [id number]  Unit/Bed#: -Kenny Encounter: [id number]  Primary Care Provider: YAMILE Maloney   Date and time admitted to hospital: 9/2/2022  5:49 PM    * Chest pain  Assessment & Plan  Patient reports on/off substernal chest pain w/o radiation for the past several days  Denies exertional or pleuritic component to the pain  Also reports left-sided mandibular pain, which she reports has been ongoing and has been given PO abx by outaptient dentist for possible dental abscess  Currently denies chest pain or other symptom/complaint      BP (!) 191/84 (BP Location: Left arm)   Pulse 92   Temp 99 1 °F (37 3 °C) (Oral)   Resp 16   Ht 5' 1" (1 549 m)   Wt 72 6 kg (160 lb)   SpO2 100%   BMI 30 23 kg/m²     · Reporting on/off substernal chest pain w/o radiation for the past several days   · Denies exertional or pleuritic component to pain  · Reports sometimes eating a large meal can cause the pain  · Currently chest pain free  · ELLY 4  · Troponin 60  · EKG NSR HR 81  · CXR wet read w/o acute abnormality   · ED spoke w/ cardiology who recommend obs and trop trending  · Tele monitoring  · Trend remainder of troponin/EKG    Pain, dental  Assessment & Plan  · Reporting left sided dental pain   · Has seen outpatient dentis who prescribed PO abx w/ concern for developing abscess  · Reports current left mandibular pain at this time  · Minimal left mandibular swelling, w/o erythema, and w/minimal pain on palpation appreciated at this time  · Does not meet SEPSIS criteria on admission  · Afebrile; no leukocytosis   · ED gave IV unasyn; continue  · Trend WBC; f/u w/pending infectious labs   · PRN pain control     Diabetes mellitus (HCC)  Assessment & Plan    Lab Results   Component Value Date    HGBA1C 7 2 (H) 06/03/2022     ·   · Hold home metformin  · Correctional SSI  · Hypoglycemia protocol  · Diabetic diet Hypertension  Assessment & Plan  · /84  · Suspecting pain may be contributing   · Continue home lisinopril  · PRN IV hydralazine ordered   · Monitor BP per unit protocol     VTE Pharmacologic Prophylaxis: VTE Score: 3 Moderate Risk (Score 3-4) - Pharmacological DVT Prophylaxis Ordered: enoxaparin (Lovenox)  Code Status: Level 1 - Full Code   Discussion with family: update in AM      Anticipated Length of Stay: Patient will be admitted on an observation basis with an anticipated length of stay of less than 2 midnights secondary to chest pain  Total Time for Visit, including Counseling / Coordination of Care: 45 minutes Greater than 50% of this total time spent on direct patient counseling and coordination of care  Chief Complaint: chest pain    History of Present Illness:  Sue Duenas is a 66 y o  female with a PMH of T2DM and HTN who presents with chest pain  Patient reports on/off substernal chest pain w/o radiation for the past several days  Denies exertional or pleuritic component to the pain  Also reports left-sided mandibular pain, which she reports has been ongoing and has been given PO abx by outaptient dentist for possible dental abscess  Currently denies chest pain or other symptom/complaint  All questions answered at the bedside to the best of my ability  Review of Systems:  Review of Systems   Constitutional: Negative for activity change, appetite change, chills, diaphoresis, fatigue and fever  HENT: Negative for congestion, ear pain, nosebleeds and trouble swallowing  Eyes: Negative for pain and visual disturbance  Respiratory: Negative for apnea, cough, chest tightness, shortness of breath and wheezing  Cardiovascular: Positive for chest pain  Negative for palpitations and leg swelling  Gastrointestinal: Negative for abdominal distention, abdominal pain, blood in stool, constipation, diarrhea, nausea and vomiting     Endocrine: Negative for cold intolerance, heat intolerance and polyuria  Genitourinary: Negative for difficulty urinating, dysuria, flank pain and hematuria  Musculoskeletal: Negative for arthralgias, neck pain and neck stiffness  Skin: Negative for color change, rash and wound  Neurological: Negative for dizziness, tremors, syncope, weakness, light-headedness, numbness and headaches  Hematological: Negative for adenopathy  All other systems reviewed and are negative  Past Medical and Surgical History:   Past Medical History:   Diagnosis Date    Diabetes mellitus ([facility] [address] )     Hypertension        No past surgical history on file  Meds/Allergies:  Prior to Admission medications    Medication Sig Start Date End Date Taking? Authorizing Provider   Cetirizine HCl (ZYRTEC ALLERGY) 10 MG CAPS Take 10 mg by mouth daily 20   Historical Provider, MD   desonide (DESOWEN) 0 05 % ointment Apply topically 2 (two) times a day 20   Beau Long MD   lisinopril (ZESTRIL) 5 mg tablet  20   Historical Provider, MD   metFORMIN (GLUCOPHAGE) 500 mg tablet Take 500 mg by mouth 20   Historical Provider, MD     I have reviewed home medications with patient personally  Allergies: Allergies   Allergen Reactions    Januvia [Sitagliptin] Hives       Social History:  Marital Status: /Civil Union   Occupation: N/A  Patient Pre-hospital Living Situation: Home  Patient Pre-hospital Level of Mobility: walks  Patient Pre-hospital Diet Restrictions: diabetic  Substance Use History:   Social History     Substance and Sexual Activity   Alcohol Use Yes    Alcohol/week: 1 0 standard drink    Types: 1 Glasses of wine per week     Social History     Tobacco Use   Smoking Status Former Smoker    Quit date: 1995    Years since quittin 7   Smokeless Tobacco Never Used     Social History     Substance and Sexual Activity   Drug Use Never       Family History:  No family history on file      Physical Exam:     Vitals:   Blood Pressure: 166/77 (09/02/22 2000)  Pulse: 92 (09/02/22 2000)  Temperature: 99 1 °F (37 3 °C) (09/02/22 1733)  Temp Source: Oral (09/02/22 1733)  Respirations: 16 (09/02/22 1915)  Height: 5' 1" (154 9 cm) (09/02/22 1733)  Weight - Scale: 72 6 kg (160 lb) (09/02/22 1733)  SpO2: 98 % (09/02/22 2000)    Physical Exam  Vitals and nursing note reviewed  Constitutional:       General: She is not in acute distress  Appearance: Normal appearance  HENT:      Head: Normocephalic and atraumatic  Right Ear: External ear normal       Left Ear: External ear normal       Nose: Nose normal       Mouth/Throat:      Mouth: Mucous membranes are moist    Eyes:      Pupils: Pupils are equal, round, and reactive to light  Cardiovascular:      Rate and Rhythm: Normal rate and regular rhythm  Pulses: Normal pulses  Heart sounds: Normal heart sounds  No murmur heard  Pulmonary:      Effort: Pulmonary effort is normal  No respiratory distress  Breath sounds: Normal breath sounds  No wheezing or rales  Chest:      Chest wall: No tenderness  Abdominal:      General: Bowel sounds are normal  There is no distension  Palpations: Abdomen is soft  There is no mass  Tenderness: There is no abdominal tenderness  There is no guarding  Musculoskeletal:         General: No swelling or tenderness  Cervical back: Normal range of motion and neck supple  No rigidity or tenderness  Right lower leg: No edema  Left lower leg: No edema  Skin:     General: Skin is warm and dry  Capillary Refill: Capillary refill takes less than 2 seconds  Findings: No lesion or rash  Neurological:      General: No focal deficit present  Mental Status: She is alert     Psychiatric:         Mood and Affect: Mood normal           Additional Data:     Lab Results:  Results from last 7 days   Lab Units 09/02/22  1812   WBC Thousand/uL 7 89   HEMOGLOBIN g/dL 13 4   HEMATOCRIT % 41 9   PLATELETS Thousands/uL 186   NEUTROS PCT % 71   LYMPHS PCT % 17   MONOS PCT % 9   EOS PCT % 2     Results from last 7 days   Lab Units 09/02/22  1812   SODIUM mmol/L 139   POTASSIUM mmol/L 4 1   CHLORIDE mmol/L 101   CO2 mmol/L 30   BUN mg/dL 13   CREATININE mg/dL 0 86   ANION GAP mmol/L 8   CALCIUM mg/dL 9 1   ALBUMIN g/dL 3 5   TOTAL BILIRUBIN mg/dL 0 43   ALK PHOS U/L 79   ALT U/L 18   AST U/L 13   GLUCOSE RANDOM mg/dL 193*                       Imaging: Personally reviewed the following imaging: chest xray  XR chest 1 view portable    (Results Pending)       EKG and Other Studies Reviewed on Admission:   · EKG: NSR  HR 89     ** Please Note: This note has been constructed using a voice recognition system   **

## 2022-09-02 NOTE — ASSESSMENT & PLAN NOTE
· Reporting left sided dental pain   · Has seen outpatient dentis who prescribed PO abx w/ concern for developing abscess  · Reports current left mandibular pain at this time  · Minimal left mandibular swelling, w/o erythema, and w/minimal pain on palpation appreciated at this time  · Does not meet SEPSIS criteria on admission  · Afebrile; no leukocytosis   · ED gave IV unasyn; continue  · Trend WBC; f/u w/pending infectious labs   · PRN pain control

## 2022-09-03 LAB
ATRIAL RATE: 84 BPM
ATRIAL RATE: 89 BPM
GLUCOSE SERPL-MCNC: 117 MG/DL (ref 65–140)
GLUCOSE SERPL-MCNC: 175 MG/DL (ref 65–140)
GLUCOSE SERPL-MCNC: 180 MG/DL (ref 65–140)
GLUCOSE SERPL-MCNC: 195 MG/DL (ref 65–140)
P AXIS: 22 DEGREES
P AXIS: 67 DEGREES
PR INTERVAL: 126 MS
PR INTERVAL: 134 MS
QRS AXIS: -20 DEGREES
QRS AXIS: 0 DEGREES
QRSD INTERVAL: 84 MS
QRSD INTERVAL: 88 MS
QT INTERVAL: 362 MS
QT INTERVAL: 370 MS
QTC INTERVAL: 427 MS
QTC INTERVAL: 450 MS
T WAVE AXIS: 13 DEGREES
T WAVE AXIS: 55 DEGREES
VENTRICULAR RATE: 84 BPM
VENTRICULAR RATE: 89 BPM

## 2022-09-03 PROCEDURE — 99232 SBSQ HOSP IP/OBS MODERATE 35: CPT | Performed by: INTERNAL MEDICINE

## 2022-09-03 PROCEDURE — 82948 REAGENT STRIP/BLOOD GLUCOSE: CPT

## 2022-09-03 PROCEDURE — 99222 1ST HOSP IP/OBS MODERATE 55: CPT | Performed by: INTERNAL MEDICINE

## 2022-09-03 PROCEDURE — 93010 ELECTROCARDIOGRAM REPORT: CPT | Performed by: INTERNAL MEDICINE

## 2022-09-03 RX ADMIN — MORPHINE SULFATE 2 MG: 2 INJECTION, SOLUTION INTRAMUSCULAR; INTRAVENOUS at 21:17

## 2022-09-03 RX ADMIN — MORPHINE SULFATE 4 MG: 4 INJECTION INTRAVENOUS at 02:40

## 2022-09-03 RX ADMIN — ENOXAPARIN SODIUM 40 MG: 40 INJECTION SUBCUTANEOUS at 08:52

## 2022-09-03 RX ADMIN — INSULIN LISPRO 1 UNITS: 100 INJECTION, SOLUTION INTRAVENOUS; SUBCUTANEOUS at 21:18

## 2022-09-03 RX ADMIN — SODIUM CHLORIDE 3 G: 9 INJECTION, SOLUTION INTRAVENOUS at 09:00

## 2022-09-03 RX ADMIN — MORPHINE SULFATE 2 MG: 2 INJECTION, SOLUTION INTRAMUSCULAR; INTRAVENOUS at 17:16

## 2022-09-03 RX ADMIN — LISINOPRIL 5 MG: 5 TABLET ORAL at 08:52

## 2022-09-03 RX ADMIN — SODIUM CHLORIDE 3 G: 9 INJECTION, SOLUTION INTRAVENOUS at 23:24

## 2022-09-03 RX ADMIN — SODIUM CHLORIDE 3 G: 9 INJECTION, SOLUTION INTRAVENOUS at 17:15

## 2022-09-03 RX ADMIN — INSULIN LISPRO 1 UNITS: 100 INJECTION, SOLUTION INTRAVENOUS; SUBCUTANEOUS at 11:30

## 2022-09-03 RX ADMIN — KETOROLAC TROMETHAMINE 15 MG: 30 INJECTION, SOLUTION INTRAMUSCULAR at 10:21

## 2022-09-03 RX ADMIN — KETOROLAC TROMETHAMINE 15 MG: 30 INJECTION, SOLUTION INTRAMUSCULAR at 19:44

## 2022-09-03 RX ADMIN — SODIUM CHLORIDE 3 G: 9 INJECTION, SOLUTION INTRAVENOUS at 02:37

## 2022-09-03 RX ADMIN — INSULIN LISPRO 1 UNITS: 100 INJECTION, SOLUTION INTRAVENOUS; SUBCUTANEOUS at 08:00

## 2022-09-03 NOTE — ASSESSMENT & PLAN NOTE
· Patient reports on/off substernal chest pain w/o radiation for the past several days  Denies exertional or pleuritic component to the pain     · Troponin noted to be elevated at approx 60  · EKG w/o signs of ischemia  · Cardiology consulted for possible ischemic eval  · F/u recs

## 2022-09-03 NOTE — PLAN OF CARE
Problem: PAIN - ADULT  Goal: Verbalizes/displays adequate comfort level or baseline comfort level  Description: Interventions:  - Encourage patient to monitor pain and request assistance  - Assess pain using appropriate pain scale  - Administer analgesics based on type and severity of pain and evaluate response  - Implement non-pharmacological measures as appropriate and evaluate response  - Consider cultural and social influences on pain and pain management  - Notify physician/advanced practitioner if interventions unsuccessful or patient reports new pain  Outcome: Progressing     Problem: SAFETY ADULT  Goal: Patient will remain free of falls  Description: INTERVENTIONS:  - Educate patient/family on patient safety including physical limitations  - Instruct patient to call for assistance with activity   - Consult OT/PT to assist with strengthening/mobility   - Keep Call bell within reach  - Keep bed low and locked with side rails adjusted as appropriate  - Keep care items and personal belongings within reach  - Initiate and maintain comfort rounds  - Make Fall Risk Sign visible to staff  - Apply yellow socks and bracelet for high fall risk patients  - Consider moving patient to room near nurses station  Outcome: Progressing     Problem: DISCHARGE PLANNING  Goal: Discharge to home or other facility with appropriate resources  Description: INTERVENTIONS:  - Identify barriers to discharge w/patient and caregiver  - Arrange for needed discharge resources and transportation as appropriate  - Identify discharge learning needs (meds, wound care, etc )  - Arrange for interpretive services to assist at discharge as needed  - Refer to Case Management Department for coordinating discharge planning if the patient needs post-hospital services based on physician/advanced practitioner order or complex needs related to functional status, cognitive ability, or social support system  Outcome: Progressing     Problem: Knowledge Deficit  Goal: Patient/family/caregiver demonstrates understanding of disease process, treatment plan, medications, and discharge instructions  Description: Complete learning assessment and assess knowledge base    Interventions:  - Provide teaching at level of understanding  - Provide teaching via preferred learning methods  Outcome: Progressing     Problem: METABOLIC, FLUID AND ELECTROLYTES - ADULT  Goal: Glucose maintained within target range  Description: INTERVENTIONS:  - Monitor Blood Glucose as ordered  - Assess for signs and symptoms of hyperglycemia and hypoglycemia  - Administer ordered medications to maintain glucose within target range  - Assess nutritional intake and initiate nutrition service referral as needed  Outcome: Progressing

## 2022-09-03 NOTE — PROGRESS NOTES
[facility]  Progress Note - Silvana Macedo 1950, 66 y o  female MRN: [id number]  Unit/Bed#: -Kenny Encounter: [id number]  Primary Care Provider: YAMILE Griffith   Date and time admitted to hospital: 9/2/2022  5:49 PM    * Chest pain  Assessment & Plan  · Patient reports on/off substernal chest pain w/o radiation for the past several days  Denies exertional or pleuritic component to the pain  · Troponin noted to be elevated at approx 60  · EKG w/o signs of ischemia  · Cardiology consulted for possible ischemic eval  · F/u recs    Pain, dental  Assessment & Plan  · Reporting left sided dental pain   · Has seen outpatient dentis who prescribed PO abx w/ concern for developing abscess  · Reports current left mandibular pain at this time  · Minimal left mandibular swelling, w/o erythema, and w/minimal pain on palpation appreciated at this time  · Monitor off antibiotics    Diabetes mellitus ([facility] [address] )  Assessment & Plan    Lab Results   Component Value Date    HGBA1C 7 2 (H) 06/03/2022     ·   · Hold home metformin  · [employer] SSI  · Hypoglycemia protocol  · Diabetic diet     Hypertension  Assessment & Plan  · /84  · Suspecting pain contributing   · Has since improved  · Continue home lisinopril        VTE Pharmacologic Prophylaxis:   Pharmacologic: Heparin  Mechanical VTE Prophylaxis in Place: Yes    Patient Centered Rounds: I have performed bedside rounds with nursing staff today  Discussions with Specialists or Other Care Team Provider: cm, nursing    Education and Discussions with Family / Patient: pt    Time Spent for Care: 30 minutes  More than 50% of total time spent on counseling and coordination of care as described above      Current Length of Stay: 0 day(s)    Current Patient Status: Observation   Certification Statement: The patient will continue to require additional inpatient hospital stay due to see below    Discharge Plan: pending further cardiac eval    Code Status: Level 1 - Full Code      Subjective:   Denies chest pain, sob, cough, fevers    Objective:     Vitals:   Temp (24hrs), Av 7 °F (37 1 °C), Min:98 4 °F (36 9 °C), Max:99 1 °F (37 3 °C)    Temp:  [98 4 °F (36 9 °C)-99 1 °F (37 3 °C)] 98 6 °F (37 °C)  HR:  [76-92] 79  Resp:  [16-19] 16  BP: (129-199)/() 130/82  SpO2:  [93 %-100 %] 93 %  Body mass index is 30 23 kg/m²  Input and Output Summary (last 24 hours):     No intake or output data in the 24 hours ending 22 5382    Physical Exam:     Physical Exam  Constitutional:       General: She is not in acute distress  Appearance: She is well-developed  She is not diaphoretic  HENT:      Head: Normocephalic and atraumatic  Nose: Nose normal       Mouth/Throat:      Pharynx: No oropharyngeal exudate  Eyes:      General: No scleral icterus  Right eye: No discharge  Left eye: No discharge  Conjunctiva/sclera: Conjunctivae normal    Neck:      Thyroid: No thyromegaly  Vascular: No JVD  Cardiovascular:      Rate and Rhythm: Normal rate and regular rhythm  Heart sounds: Normal heart sounds  No murmur heard  No friction rub  No gallop  Pulmonary:      Effort: Pulmonary effort is normal  No respiratory distress  Breath sounds: Normal breath sounds  No wheezing or rales  Chest:      Chest wall: No tenderness  Abdominal:      General: Bowel sounds are normal  There is no distension  Palpations: Abdomen is soft  Tenderness: There is no abdominal tenderness  There is no guarding or rebound  Musculoskeletal:         General: No tenderness or deformity  Normal range of motion  Cervical back: Normal range of motion and neck supple  Skin:     General: Skin is warm and dry  Findings: No erythema or rash  Neurological:      Mental Status: She is alert  Mental status is at baseline  Cranial Nerves: No cranial nerve deficit  Sensory: No sensory deficit  Motor: No abnormal muscle tone  Coordination: Coordination normal            Additional Data:     Labs:    Results from last 7 days   Lab Units 09/02/22  1812   WBC Thousand/uL 7 89   HEMOGLOBIN g/dL 13 4   HEMATOCRIT % 41 9   PLATELETS Thousands/uL 186   NEUTROS PCT % 71   LYMPHS PCT % 17   MONOS PCT % 9   EOS PCT % 2     Results from last 7 days   Lab Units 09/02/22  1812   SODIUM mmol/L 139   POTASSIUM mmol/L 4 1   CHLORIDE mmol/L 101   CO2 mmol/L 30   BUN mg/dL 13   CREATININE mg/dL 0 86   ANION GAP mmol/L 8   CALCIUM mg/dL 9 1   ALBUMIN g/dL 3 5   TOTAL BILIRUBIN mg/dL 0 43   ALK PHOS U/L 79   ALT U/L 18   AST U/L 13   GLUCOSE RANDOM mg/dL 193*         Results from last 7 days   Lab Units 09/03/22  0736 09/02/22  2159   POC GLUCOSE mg/dl 175* 146*                   * I Have Reviewed All Lab Data Listed Above  * Additional Pertinent Lab Tests Reviewed: All Labs Within Last 24 Hours Reviewed    Imaging:    Imaging Reports Reviewed Today Include: na  Imaging Personally Reviewed by Myself Includes:  na    Recent Cultures (last 7 days):     Results from last 7 days   Lab Units 09/02/22  2112   BLOOD CULTURE  Received in Microbiology Lab  Culture in Progress  Received in Microbiology Lab  Culture in Progress         Last 24 Hours Medication List:   Current Facility-Administered Medications   Medication Dose Route Frequency Provider Last Rate    acetaminophen  650 mg Oral Q6H PRN Luc Castellano PA-C      ampicillin-sulbactam  3 g Intravenous Q6H Luc Castellano PA-C 3 g (09/03/22 0237)    enoxaparin  40 mg Subcutaneous Daily Luc Castellano PA-C      hydrALAZINE  5 mg Intravenous Q6H PRN Gina [place]maguer [place]      hydrALAZINE  5 mg Intravenous Once Lu Villa PA-C      insulin lispro  1-5 Units Subcutaneous TID AC Chris Brito [place], [place]      insulin lispro  1-5 Units Subcutaneous HS Gina [place], [place]      ketorolac  15 mg Intravenous Q6H PRN Gina Almaguer [place]      lisinopril 5 mg Oral Daily Ene Ugaldecairn [place], [place]      morphine injection  2 mg Intravenous Q4H PRN Ene Mobile [place], [place]      morphine injection  4 mg Intravenous Q4H PRN Анна Mesa PA-C          Today, Patient Was Seen By: Angelina Newsome MD    ** Please Note: Dictation voice to text software may have been used in the creation of this document   **

## 2022-09-03 NOTE — CONSULTS
Consultation - Cardiology Team One  Deb Hassan 66 y o  female MRN: [id number]  Unit/Bed#: -01 Encounter: [id number]    Inpatient consult to Cardiology  Consult performed by: YAMILE Rowan  Consult ordered by: Denisse Macdonald PA-C          Physician Requesting Consult: Parker Shoemaker MD  Reason for Consult / Principal Problem:  Chest pain    Assessment/Plan:    1  Chest pain  -troponin noted to be 60/60/61  -EKG without ischemic changes  -given patient's history and symptoms, will plan for pharmacological stress testing on Tuesday  -TTE ordered and pending to evaluate LVEF and structural function  -continue to monitor telemetry    2  Hypertension  -blood pressure increased at admission, but improved quickly  -continue lisinopril    3  Diabetes type 2  -most recent hemoglobin A1c noted to be 7 2    HPI: Cardiologist Dr Sarina Lucero is a 66y o  year old female who has a history of diabetes type 2, hypertension who presents to the emergency room on 09/02/2022 with complaints of chest pain  Patient states she experienced intermittent substernal chest pain without radiation for several days prior to admission  She states that for a while she has been experiencing chest pain with walking associated with shortness of breath  In the emergency room she was noted to be quite hypertensive, but overall blood pressure is improved overnight  High sensitivity troponin noted to be 60/60/61  Chest x-ray revealed no acute cardiopulmonary findings    EKG revealed normal sinus rhythm   Her last ischemic evaluation was in December 2020 via pharmacological stress testing which was normal, that tested demonstrate an EF of 65%    REVIEW OF SYSTEMS:  Constitutional:  Denies fever or chills   Eyes:  Denies change in visual acuity   HENT:  Denies nasal congestion or sore throat   Respiratory:  Denies cough, orthopnea, PND or shortness of breath   Cardiovascular:  + chest pain, denies palpitations or edema   GI:  Denies abdominal pain, nausea, vomiting, bloody stools or diarrhea   :  Denies dysuria, frequency, difficulty in micturition and nocturia  Musculoskeletal:  Denies back pain or joint pain   Neurologic:  Denies headache, focal weakness or sensory changes   Endocrine:  Denies polyuria or polydipsia   Lymphatic:  Denies swollen glands   Psychiatric:  Denies depression or anxiety     Historical Information   Past Medical History:   Diagnosis Date    Diabetes mellitus ([facility] [address] )     Hypertension      No past surgical history on file  Social History     Substance and Sexual Activity   Alcohol Use Yes    Alcohol/week: 1 0 standard drink    Types: 1 Glasses of wine per week     Social History     Substance and Sexual Activity   Drug Use Never     Social History     Tobacco Use   Smoking Status Former Smoker    Quit date: 1995    Years since quittin 7   Smokeless Tobacco Never Used       Family History: No family history on file      MEDS & ALLERGIES:  all current active meds have been reviewed and current meds:   Current Facility-Administered Medications   Medication Dose Route Frequency    acetaminophen (TYLENOL) tablet 650 mg  650 mg Oral Q6H PRN    ampicillin-sulbactam (UNASYN) 3 g in sodium chloride 0 9 % 100 mL IVPB  3 g Intravenous Q6H    enoxaparin (LOVENOX) subcutaneous injection 40 mg  40 mg Subcutaneous Daily    hydrALAZINE (APRESOLINE) injection 5 mg  5 mg Intravenous Q6H PRN    hydrALAZINE (APRESOLINE) injection 5 mg  5 mg Intravenous Once    insulin lispro (HumaLOG) 100 units/mL subcutaneous injection 1-5 Units  1-5 Units Subcutaneous TID AC    insulin lispro (HumaLOG) 100 units/mL subcutaneous injection 1-5 Units  1-5 Units Subcutaneous HS    ketorolac (TORADOL) injection 15 mg  15 mg Intravenous Q6H PRN    lisinopril (ZESTRIL) tablet 5 mg  5 mg Oral Daily    morphine injection 2 mg  2 mg Intravenous Q4H PRN    morphine injection 4 mg  4 mg Intravenous Q4H PRN Allergies   Allergen Reactions    Demonduvia [Sitagliptin] Hives       OBJECTIVE:  Vitals:   Vitals:    09/03/22 0738   BP: 130/82   Pulse: 79   Resp:    Temp: 98 6 °F (37 °C)   SpO2: 93%     Body mass index is 30 23 kg/m²  Systolic (21GIW), UET:937 , Min:129 , OYW:827     Diastolic (05TKR), EYE:70, Min:77, Max:107    No intake or output data in the 24 hours ending 09/03/22 0843  Weight (last 2 days)     Date/Time Weight    09/02/22 1733 72 6 (160)        Invasive Devices  Report    Peripheral Intravenous Line  Duration           Peripheral IV 09/02/22 Proximal;Right;Ventral (anterior) Forearm <1 day                PHYSICAL EXAMS:  General:  Patient is not in acute distress, laying in the bed comfortably, awake, alert   Head: Normocephalic, Atraumatic     HEENT: White sclera, pink conjunctiva  Neck:  Supple, no neck vein distention  Respiratory: clear to auscultation   Cardiovascular:  PMI normal, S1-S2 normal, + 2/6 systolic murmur, no thrills, gallops, rubs, regular rhythm  GI:  Abdomen soft, non-tender, non-distended  Extremities: No edema, normal pulses  Integument:  No skin rashes or ulceration  Lymphatic:  No cervical or inguinal lymphadenopathy  Neurologic:  Patient is awake alert, responding to command, oriented to person, place and time     LABORATORY RESULTS:      CBC with diff:   Results from last 7 days   Lab Units 09/02/22  1812   WBC Thousand/uL 7 89   HEMOGLOBIN g/dL 13 4   HEMATOCRIT % 41 9   MCV fL 90   PLATELETS Thousands/uL 186   MCH pg 28 6   MCHC g/dL 32 0   RDW % 13 5   MPV fL 12 7   NRBC AUTO /100 WBCs 0       CMP:  Results from last 7 days   Lab Units 09/02/22  1812   POTASSIUM mmol/L 4 1   CHLORIDE mmol/L 101   CO2 mmol/L 30   BUN mg/dL 13   CREATININE mg/dL 0 86   CALCIUM mg/dL 9 1   AST U/L 13   ALT U/L 18   ALK PHOS U/L 79   EGFR ml/min/1 73sq m 67       BMP:  Results from last 7 days   Lab Units 09/02/22  1812   POTASSIUM mmol/L 4 1   CHLORIDE mmol/L 101   CO2 mmol/L 30   BUN mg/dL 13 CREATININE mg/dL 0 86   CALCIUM mg/dL 9 1                              Lipid Profile:   No results found for: CHOL  No results found for: HDL  No results found for: LDLCALC  No results found for: TRIG    Cardiac testing:   No results found for this or any previous visit  No results found for this or any previous visit  No valid procedures specified  No results found for this or any previous visit  Imaging:   I have personally reviewed pertinent reports          EKG reviewed personally:  Normal sinus rhythm with nonspecific ST changes    Telemetry reviewed personally:   Sinus rhythm with a rate in the 70s      Code Status: Level 1 - Full Code      YAMILE Alonso  9/3/2022,8:43 AM

## 2022-09-03 NOTE — ASSESSMENT & PLAN NOTE
· Reporting left sided dental pain   · Has seen outpatient dentis who prescribed PO abx w/ concern for developing abscess  · Reports current left mandibular pain at this time  · Minimal left mandibular swelling, w/o erythema, and w/minimal pain on palpation appreciated at this time  · Monitor off antibiotics

## 2022-09-04 ENCOUNTER — APPOINTMENT (INPATIENT)
Dept: NON INVASIVE DIAGNOSTICS | Facility: HOSPITAL | Age: 72
DRG: 158 | End: 2022-09-04
Payer: MEDICARE

## 2022-09-04 LAB
ANION GAP SERPL CALCULATED.3IONS-SCNC: 8 MMOL/L (ref 4–13)
AORTIC ROOT: 2.9 CM
AORTIC VALVE MEAN VELOCITY: 16.1 M/S
APICAL FOUR CHAMBER EJECTION FRACTION: 56 %
ASCENDING AORTA: 2.9 CM
AV AREA BY CONTINUOUS VTI: 1.2 CM2
AV AREA PEAK VELOCITY: 1.1 CM2
AV LVOT MEAN GRADIENT: 2 MMHG
AV LVOT PEAK GRADIENT: 3 MMHG
AV MEAN GRADIENT: 12 MMHG
AV PEAK GRADIENT: 20 MMHG
AV VALVE AREA: 1.19 CM2
AV VELOCITY RATIO: 0.38
BUN SERPL-MCNC: 22 MG/DL (ref 5–25)
CALCIUM SERPL-MCNC: 8.8 MG/DL (ref 8.3–10.1)
CHLORIDE SERPL-SCNC: 100 MMOL/L (ref 96–108)
CO2 SERPL-SCNC: 29 MMOL/L (ref 21–32)
CREAT SERPL-MCNC: 0.73 MG/DL (ref 0.6–1.3)
DOP CALC AO PEAK VEL: 2.26 M/S
DOP CALC AO VTI: 45.39 CM
DOP CALC LVOT AREA: 3.14 CM2
DOP CALC LVOT DIAMETER: 2 CM
DOP CALC LVOT PEAK VEL VTI: 17.2 CM
DOP CALC LVOT PEAK VEL: 0.85 M/S
DOP CALC LVOT STROKE INDEX: 31.4 ML/M2
DOP CALC LVOT STROKE VOLUME: 54.01
E WAVE DECELERATION TIME: 205 MS
FRACTIONAL SHORTENING: 34 (ref 28–44)
GFR SERPL CREATININE-BSD FRML MDRD: 81 ML/MIN/1.73SQ M
GLUCOSE SERPL-MCNC: 142 MG/DL (ref 65–140)
GLUCOSE SERPL-MCNC: 163 MG/DL (ref 65–140)
GLUCOSE SERPL-MCNC: 164 MG/DL (ref 65–140)
GLUCOSE SERPL-MCNC: 193 MG/DL (ref 65–140)
INTERVENTRICULAR SEPTUM IN DIASTOLE (PARASTERNAL SHORT AXIS VIEW): 1.3 CM
INTERVENTRICULAR SEPTUM: 1.3 CM (ref 0.6–1.1)
LAAS-AP4: 18.1 CM2
LEFT ATRIUM AREA SYSTOLE SINGLE PLANE A4C: 18 CM2
LEFT ATRIUM SIZE: 4.2 CM
LEFT INTERNAL DIMENSION IN SYSTOLE: 3.3 CM (ref 2.1–4)
LEFT VENTRICULAR INTERNAL DIMENSION IN DIASTOLE: 5 CM (ref 3.5–6)
LEFT VENTRICULAR POSTERIOR WALL IN END DIASTOLE: 1.1 CM
LEFT VENTRICULAR STROKE VOLUME: 75 ML
LVSV (TEICH): 75 ML
MITRAL REGURGITATION PEAK VELOCITY: 5.51 M/S
MITRAL VALVE MEAN INFLOW VELOCITY: 4.67 M/S
MITRAL VALVE REGURGITANT PEAK GRADIENT: 122 MMHG
MV E'TISSUE VEL-SEP: 8 CM/S
MV PEAK A VEL: 1.06 M/S
MV PEAK E VEL: 88 CM/S
MV STENOSIS PRESSURE HALF TIME: 59 MS
MV VALVE AREA P 1/2 METHOD: 3.73
PISA MRMAX VEL: 0.36 M/S
PISA RADIUS: 0.4 CM
POTASSIUM SERPL-SCNC: 4.2 MMOL/L (ref 3.5–5.3)
RIGHT ATRIAL 2D VOLUME: 35 ML
RIGHT ATRIUM AREA SYSTOLE A4C: 13.4 CM2
RIGHT VENTRICLE ID DIMENSION: 4.1 CM
SL CV DOP CALC MV VTI RETROGRADE: 208.1 CM
SL CV LV EF: 60
SL CV MV MEAN GRADIENT RETROGRADE: 92 MMHG
SL CV PED ECHO LEFT VENTRICLE DIASTOLIC VOLUME (MOD BIPLANE) 2D: 119 ML
SL CV PED ECHO LEFT VENTRICLE SYSTOLIC VOLUME (MOD BIPLANE) 2D: 44 ML
SODIUM SERPL-SCNC: 137 MMOL/L (ref 135–147)
TR MAX PG: 29 MMHG
TR PEAK VELOCITY: 2.7 M/S
TRICUSPID VALVE PEAK REGURGITATION VELOCITY: 2.71 M/S

## 2022-09-04 PROCEDURE — 93306 TTE W/DOPPLER COMPLETE: CPT

## 2022-09-04 PROCEDURE — 99232 SBSQ HOSP IP/OBS MODERATE 35: CPT | Performed by: INTERNAL MEDICINE

## 2022-09-04 PROCEDURE — 82948 REAGENT STRIP/BLOOD GLUCOSE: CPT

## 2022-09-04 PROCEDURE — 93306 TTE W/DOPPLER COMPLETE: CPT | Performed by: INTERNAL MEDICINE

## 2022-09-04 PROCEDURE — 80048 BASIC METABOLIC PNL TOTAL CA: CPT | Performed by: INTERNAL MEDICINE

## 2022-09-04 RX ORDER — HYDROMORPHONE HCL/PF 1 MG/ML
0.5 SYRINGE (ML) INJECTION EVERY 6 HOURS PRN
Status: DISCONTINUED | OUTPATIENT
Start: 2022-09-04 | End: 2022-09-06 | Stop reason: HOSPADM

## 2022-09-04 RX ORDER — HYDROMORPHONE HCL IN WATER/PF 6 MG/30 ML
0.2 PATIENT CONTROLLED ANALGESIA SYRINGE INTRAVENOUS EVERY 6 HOURS PRN
Status: DISCONTINUED | OUTPATIENT
Start: 2022-09-04 | End: 2022-09-06 | Stop reason: HOSPADM

## 2022-09-04 RX ADMIN — ACETAMINOPHEN 650 MG: 325 TABLET ORAL at 18:08

## 2022-09-04 RX ADMIN — LISINOPRIL 5 MG: 5 TABLET ORAL at 09:07

## 2022-09-04 RX ADMIN — HYDROMORPHONE HYDROCHLORIDE 0.2 MG: 0.2 INJECTION, SOLUTION INTRAMUSCULAR; INTRAVENOUS; SUBCUTANEOUS at 15:37

## 2022-09-04 RX ADMIN — SODIUM CHLORIDE 3 G: 9 INJECTION, SOLUTION INTRAVENOUS at 18:01

## 2022-09-04 RX ADMIN — INSULIN LISPRO 1 UNITS: 100 INJECTION, SOLUTION INTRAVENOUS; SUBCUTANEOUS at 22:10

## 2022-09-04 RX ADMIN — ENOXAPARIN SODIUM 40 MG: 40 INJECTION SUBCUTANEOUS at 09:07

## 2022-09-04 RX ADMIN — HYDROMORPHONE HYDROCHLORIDE 0.5 MG: 1 INJECTION, SOLUTION INTRAMUSCULAR; INTRAVENOUS; SUBCUTANEOUS at 22:17

## 2022-09-04 RX ADMIN — KETOROLAC TROMETHAMINE 15 MG: 30 INJECTION, SOLUTION INTRAMUSCULAR at 04:05

## 2022-09-04 RX ADMIN — SODIUM CHLORIDE 3 G: 9 INJECTION, SOLUTION INTRAVENOUS at 05:12

## 2022-09-04 RX ADMIN — SODIUM CHLORIDE 3 G: 9 INJECTION, SOLUTION INTRAVENOUS at 11:58

## 2022-09-04 RX ADMIN — HYDROMORPHONE HYDROCHLORIDE 0.5 MG: 1 INJECTION, SOLUTION INTRAMUSCULAR; INTRAVENOUS; SUBCUTANEOUS at 08:22

## 2022-09-04 RX ADMIN — KETOROLAC TROMETHAMINE 15 MG: 30 INJECTION, SOLUTION INTRAMUSCULAR at 12:04

## 2022-09-04 RX ADMIN — MORPHINE SULFATE 4 MG: 4 INJECTION INTRAVENOUS at 01:40

## 2022-09-04 RX ADMIN — INSULIN LISPRO 1 UNITS: 100 INJECTION, SOLUTION INTRAVENOUS; SUBCUTANEOUS at 12:08

## 2022-09-04 NOTE — PROGRESS NOTES
General Cardiology   Progress Note   Kalyani Preston 66 y o  female MRN: [id number]  Unit/Bed#: -01 Encounter: [id number]    Assessment/Plan:    1  Chest pain  -troponin noted to be 60/60/61  -EKG without ischemic changes  -given patient's history and symptoms, will plan for pharmacological stress testing on Tuesday  -TTE ordered and pending to evaluate LVEF and structural function  -continue to monitor telemetry     2  Left mandibular pain  -noted to have swelling at admission  -currently being monitored off antibiotics    3  Hypertension  -blood pressure increased at admission, but improved quickly  -continue lisinopril     4  Diabetes type 2  -most recent hemoglobin A1c noted to be 7 2      Subjective:   Patient seen and examined  Patient states she experienced a very short chest pain episode last evening, lasting a few seconds  REVIEW OF SYSTEMS:  Constitutional:  Denies fever or chills   Eyes:  Denies change in visual acuity   HENT:  Denies nasal congestion or sore throat   Respiratory:  Denies cough, orthopnea, PND or shortness of breath   Cardiovascular:  + chest pain, denies palpitations or edema   GI:  Denies abdominal pain, nausea, vomiting, bloody stools, constipation or diarrhea   :  Denies dysuria, frequency, difficulty in urination or nocturia  Musculoskeletal:  Denies back pain or joint pain   Neurologic:  Denies headache, focal weakness or sensory changes   Endocrine:  Denies polyuria or polydipsia   Lymphatic:  Denies swollen glands   Psychiatric:  Denies depression or anxiety     Objective:   Vitals:  Vitals:    09/04/22 0723   BP: (!) 145/103   Pulse:    Resp:    Temp: 98 4 °F (36 9 °C)   SpO2:        Body mass index is 30 23 kg/m²    Systolic (08KXV), ADP:427 , Min:103 , XES:474     Diastolic (59ZDW), RWP:75, Min:65, Max:103      Intake/Output Summary (Last 24 hours) at 9/4/2022 1036  Last data filed at 9/4/2022 0859  Gross per 24 hour   Intake 720 ml   Output --   Net 720 ml     Weight (last 2 days)     Date/Time Weight    09/02/22 1733 72 6 (160)          Telemetry Review: No significant arrhythmias seen on telemetry review  PHYSICAL EXAMS  General:  Patient is not in acute distress, laying in the bed comfortably, awake  Head: Normocephalic, Atraumatic  HEENT: White sclera, pink conjunctiva  Neck:  Supple, no neck vein distention  Respiratory: clear to auscultation   Cardiovascular: S1-S2 normal, +9/6 sytolic murmur, thrills, gallops, rubs, regular rhythm  GI:  Abdomen soft, nontender, non-distended  Extremities: No edema, normal pulses  Integument:  No skin rashes or ulceration  Neurologic:  Patient is awake alert, responding to command, oriented to person, place and time    LABORATORY RESULTS:      CBC with diff:   Results from last 7 days   Lab Units 09/02/22  1812   WBC Thousand/uL 7 89   HEMOGLOBIN g/dL 13 4   HEMATOCRIT % 41 9   MCV fL 90   PLATELETS Thousands/uL 186   MCH pg 28 6   MCHC g/dL 32 0   RDW % 13 5   MPV fL 12 7   NRBC AUTO /100 WBCs 0       CMP:  Results from last 7 days   Lab Units 09/04/22  0521 09/02/22  1812   POTASSIUM mmol/L 4 2 4 1   CHLORIDE mmol/L 100 101   CO2 mmol/L 29 30   BUN mg/dL 22 13   CREATININE mg/dL 0 73 0 86   CALCIUM mg/dL 8 8 9 1   AST U/L  --  13   ALT U/L  --  18   ALK PHOS U/L  --  79   EGFR ml/min/1 73sq m 81 67       BMP:  Results from last 7 days   Lab Units 09/04/22  0521 09/02/22  1812   POTASSIUM mmol/L 4 2 4 1   CHLORIDE mmol/L 100 101   CO2 mmol/L 29 30   BUN mg/dL 22 13   CREATININE mg/dL 0 73 0 86   CALCIUM mg/dL 8 8 9 1                                Lipid Profile:   No results found for: CHOL  No results found for: HDL  No results found for: LDLCALC  No results found for: TRIG    Cardiac testing:   No results found for this or any previous visit  No results found for this or any previous visit  No results found for this or any previous visit  No valid procedures specified    No results found for this or any previous visit       Meds/Allergies   all current active meds have been reviewed  Medications Prior to Admission   Medication    lisinopril (ZESTRIL) 5 mg tablet    metFORMIN (GLUCOPHAGE) 500 mg tablet    Cetirizine HCl 10 MG CAPS    desonide (DESOWEN) 0 05 % ointment              ** Please Note: Dragon 360 Dictation voice to text software may have been used in the creation of this document   **

## 2022-09-04 NOTE — ED PROVIDER NOTES
History  Chief Complaint   Patient presents with    Chest Pain     Pt c/o CP/palpitations onset a couple of days; became worse today with sob; pt also notes dental abscess pain currently taking antibiotics     75-year-old female presenting to emergency department for evaluation of chest pain  Patient has had intermittent chest pain and palpitations for the past 2-3 days, worse today, and associated with shortness of breath which has been somewhat exertional   She currently states that she has no pain at rest, she has a secondary complaint as well as a left lower dental abscess which she has been on oral antibiotics but has had increased pain and swelling of the tooth  Prior to Admission Medications   Prescriptions Last Dose Informant Patient Reported? Taking? Cetirizine HCl 10 MG CAPS Not Taking at Unknown time Self Yes No   Sig: Take 10 mg by mouth daily   Patient not taking: Reported on 9/3/2022   desonide (DESOWEN) 0 05 % ointment Not Taking at Unknown time Self No No   Sig: Apply topically 2 (two) times a day   Patient not taking: Reported on 9/3/2022   lisinopril (ZESTRIL) 5 mg tablet 2022 at Unknown time Self Yes Yes   metFORMIN (GLUCOPHAGE) 500 mg tablet 2022 at Unknown time Self Yes Yes   Sig: Take 500 mg by mouth      Facility-Administered Medications: None       Past Medical History:   Diagnosis Date    Diabetes mellitus ([facility] [address] )     Hypertension        No past surgical history on file  No family history on file  I have reviewed and agree with the history as documented  E-Cigarette/Vaping    E-Cigarette Use Never User      E-Cigarette/Vaping Substances    Nicotine No     THC No     CBD No     Flavoring No     Other No     Unknown No      Social History     Tobacco Use    Smoking status: Former Smoker     Quit date: 1995     Years since quittin 7    Smokeless tobacco: Never Used   Vaping Use    Vaping Use: Never used   Substance Use Topics    Alcohol use:  Yes Alcohol/week: 1 0 standard drink     Types: 1 Glasses of wine per week    Drug use: Never       Review of Systems   Constitutional: Negative for appetite change, chills, fatigue and fever  HENT: Positive for dental problem  Negative for sneezing and sore throat  Eyes: Negative for visual disturbance  Respiratory: Negative for cough, choking, chest tightness, shortness of breath and wheezing  Cardiovascular: Positive for chest pain  Negative for palpitations  Gastrointestinal: Negative for abdominal pain, constipation, diarrhea, nausea and vomiting  Genitourinary: Negative for difficulty urinating and dysuria  Neurological: Negative for dizziness, weakness, light-headedness, numbness and headaches  All other systems reviewed and are negative  Physical Exam  Physical Exam  Vitals and nursing note reviewed  Constitutional:       General: She is not in acute distress  Appearance: She is well-developed  She is not diaphoretic  HENT:      Head: Normocephalic and atraumatic  Mouth/Throat:     Eyes:      Pupils: Pupils are equal, round, and reactive to light  Neck:      Vascular: No JVD  Trachea: No tracheal deviation  Cardiovascular:      Rate and Rhythm: Normal rate and regular rhythm  Heart sounds: Normal heart sounds  No murmur heard  No friction rub  No gallop  Pulmonary:      Effort: Pulmonary effort is normal  No respiratory distress  Breath sounds: Normal breath sounds  No wheezing or rales  Abdominal:      General: Bowel sounds are normal  There is no distension  Palpations: Abdomen is soft  Tenderness: There is no abdominal tenderness  There is no guarding or rebound  Skin:     General: Skin is warm and dry  Coloration: Skin is not pale  Neurological:      Mental Status: She is alert and oriented to person, place, and time  Cranial Nerves: No cranial nerve deficit  Motor: No abnormal muscle tone     Psychiatric: Behavior: Behavior normal          Vital Signs  ED Triage Vitals   Temperature Pulse Respirations Blood Pressure SpO2   09/02/22 1733 09/02/22 1733 09/02/22 1733 09/02/22 1733 09/02/22 1733   99 1 °F (37 3 °C) 90 19 (!) 195/94 98 %      Temp Source Heart Rate Source Patient Position - Orthostatic VS BP Location FiO2 (%)   09/02/22 1733 09/02/22 1809 09/02/22 1809 09/02/22 1809 --   Oral Monitor Sitting Left arm       Pain Score       09/02/22 1733       3           Vitals:    09/03/22 1445 09/03/22 1852 09/03/22 1852 09/03/22 1853   BP: 124/96 128/96 128/96 128/96   Pulse: 79 81 86 82   Patient Position - Orthostatic VS:             Visual Acuity      ED Medications  Medications   ampicillin-sulbactam (UNASYN) 3 g in sodium chloride 0 9 % 100 mL IVPB (3 g Intravenous New Bag 9/3/22 1715)   morphine injection 4 mg (4 mg Intravenous Given 9/3/22 0240)   ketorolac (TORADOL) injection 15 mg (15 mg Intravenous Given 9/3/22 1944)   morphine injection 2 mg (2 mg Intravenous Given 9/3/22 1716)   lisinopril (ZESTRIL) tablet 5 mg (5 mg Oral Given 9/3/22 0852)   acetaminophen (TYLENOL) tablet 650 mg (has no administration in time range)   enoxaparin (LOVENOX) subcutaneous injection 40 mg (40 mg Subcutaneous Given 9/3/22 0852)   insulin lispro (HumaLOG) 100 units/mL subcutaneous injection 1-5 Units (1 Units Subcutaneous Not Given 9/3/22 1700)   insulin lispro (HumaLOG) 100 units/mL subcutaneous injection 1-5 Units (1 Units Subcutaneous Not Given 9/2/22 2202)   hydrALAZINE (APRESOLINE) injection 5 mg (5 mg Intravenous Given 9/2/22 2137)   hydrALAZINE (APRESOLINE) injection 5 mg (5 mg Intravenous Not Given 9/3/22 0023)   aspirin chewable tablet 81 mg (81 mg Oral Given 9/2/22 1921)   morphine injection 2 mg (2 mg Intravenous Given 9/2/22 2136)       Diagnostic Studies  Results Reviewed     Procedure Component Value Units Date/Time    HS Troponin I 4hr [[id number]]  (Abnormal) Collected: 09/02/22 2230    Lab Status: Final result Specimen: Blood from Arm, Left Updated: 09/02/22 2303     hs TnI 4hr 61 ng/L      Delta 4hr hsTnI 1 ng/L     HS Troponin I 2hr [[id number]]  (Abnormal) Collected: 09/02/22 2112    Lab Status: Final result Specimen: Blood from Arm, Left Updated: 09/02/22 2146     hs TnI 2hr 60 ng/L      Delta 2hr hsTnI 0 ng/L     HS Troponin 0hr (reflex protocol) [[id number]]  (Abnormal) Collected: 09/02/22 1812    Lab Status: Final result Specimen: Blood from Arm, Right Updated: 09/02/22 1840     hs TnI 0hr 60 ng/L     Comprehensive metabolic panel [[id number]]  (Abnormal) Collected: 09/02/22 1812    Lab Status: Final result Specimen: Blood from Arm, Right Updated: 09/02/22 1832     Sodium 139 mmol/L      Potassium 4 1 mmol/L      Chloride 101 mmol/L      CO2 30 mmol/L      ANION GAP 8 mmol/L      BUN 13 mg/dL      Creatinine 0 86 mg/dL      Glucose 193 mg/dL      Calcium 9 1 mg/dL      AST 13 U/L      ALT 18 U/L      Alkaline Phosphatase 79 U/L      Total Protein 7 7 g/dL      Albumin 3 5 g/dL      Total Bilirubin 0 43 mg/dL      eGFR 67 ml/min/1 73sq m     Narrative:      [place] guidelines for Chronic Kidney Disease (CKD):     Stage 1 with normal or high GFR (GFR > 90 mL/min/1 73 square meters)    Stage 2 Mild CKD (GFR = 60-89 mL/min/1 73 square meters)    Stage 3A Moderate CKD (GFR = 45-59 mL/min/1 73 square meters)    Stage 3B Moderate CKD (GFR = 30-44 mL/min/1 73 square meters)    Stage 4 Severe CKD (GFR = 15-29 mL/min/1 73 square meters)    Stage 5 End Stage CKD (GFR <15 mL/min/1 73 square meters)  Note: GFR calculation is accurate only with a steady state creatinine    CBC and differential [[id number]] Collected: 09/02/22 1812    Lab Status: Final result Specimen: Blood from Arm, Right Updated: 09/02/22 1818     WBC 7 89 Thousand/uL      RBC 4 68 Million/uL      Hemoglobin 13 4 g/dL      Hematocrit 41 9 %      MCV 90 fL      MCH 28 6 pg      MCHC 32 0 g/dL      RDW 13 5 %      MPV 12 7 fL Platelets 798 Thousands/uL      nRBC 0 /100 WBCs      Neutrophils Relative 71 %      Immat GRANS % 0 %      Lymphocytes Relative 17 %      Monocytes Relative 9 %      Eosinophils Relative 2 %      Basophils Relative 1 %      Neutrophils Absolute 5 62 Thousands/µL      Immature Grans Absolute 0 02 Thousand/uL      Lymphocytes Absolute 1 34 Thousands/µL      Monocytes Absolute 0 69 Thousand/µL      Eosinophils Absolute 0 18 Thousand/µL      Basophils Absolute 0 04 Thousands/µL                  XR chest 1 view portable   Final Result by Margarette Chapman MD (09/03 0522)      No acute cardiopulmonary disease  Workstation performed: [id number]                    Procedures  Procedures         ED Course                                 ELLY Risk Score    Flowsheet Row Most Recent Value   Age >= 72 1 Filed at: 09/02/2022 1937   Known CAD (stenosis >= 50%) 0 Filed at: 09/02/2022 3982   Recent (<=24 hrs) Service Angina 1 Filed at: 09/02/2022 1937   ST Deviation >= 0 5 mm 0 Filed at: 09/02/2022 1937   3+ CAD Risk Factors (FHx, HTN, HLP, DM, Smoker) 1 Filed at: 09/02/2022 1937   Aspirin Use Past 7 Days 0 Filed at: 09/02/2022 1937   Elevated Cardiac Markers 1 Filed at: 09/02/2022 1937   ELLY Risk Score (Calculated) 4 Filed at: 09/02/2022 7392                  MDM  Number of Diagnoses or Management Options  Chest pain  Elevated troponin  Pain, dental  Diagnosis management comments: 64-year-old female with dental pain, as well as chest pain, will check labs EKG troponin, give IV antibiotic for the toe pain, reassess  Troponin elevated at 60, patient is currently asymptomatic with a nonischemic EKG, did reviewed case with Cardiology, recommends trending troponins for now, will give aspirin, admit         Disposition  Final diagnoses:   Chest pain   Pain, dental   Elevated troponin     Time reflects when diagnosis was documented in both MDM as applicable and the Disposition within this note     Time User Action Codes Description Comment    9/2/2022  7:39 PM Kathia Whaley Add [R07 9] Chest pain     9/2/2022  7:39 PM Feliciano Pederson Waqar [K08 9,  G89 29] Chronic dental pain     9/2/2022  7:39 PM Omkar Pederson [K08 9,  G89 29] Chronic dental pain     9/2/2022  7:39 PM Feliciano Pederson [K08 89] Pain, dental     9/2/2022  7:40 PM Feliciano Pederson Waqar [R77 8] Elevated troponin       ED Disposition     ED Disposition   Admit    Condition   Stable    Date/Time   Fri Sep 2, 2022  7:42 PM    Comment   Case was discussed with christopher and the patient's admission status was agreed to be Admission Status: observation status to the service of Dr Antoinette Kelley   Follow-up Information    None         Current Discharge Medication List      CONTINUE these medications which have NOT CHANGED    Details   lisinopril (ZESTRIL) 5 mg tablet       metFORMIN (GLUCOPHAGE) 500 mg tablet Take 500 mg by mouth      Cetirizine HCl 10 MG CAPS Take 10 mg by mouth daily      desonide (DESOWEN) 0 05 % ointment Apply topically 2 (two) times a day  Qty: 15 g, Refills: 1    Associated Diagnoses: Lichen simplex chronicus             No discharge procedures on file      PDMP Review     None          ED Provider  Electronically Signed by           Cruz Perales MD  09/03/22 2025

## 2022-09-04 NOTE — PLAN OF CARE
Problem: INFECTION - ADULT  Goal: Absence or prevention of progression during hospitalization  Description: INTERVENTIONS:  - Assess and monitor for signs and symptoms of infection  - Monitor lab/diagnostic results  - Monitor all insertion sites, i e  indwelling lines, tubes, and drains  Problem: SAFETY ADULT  Goal: Patient will remain free of falls  Description: INTERVENTIONS:  - Educate patient/family on patient safety including physical limitations  - Instruct patient to call for assistance with activity   - Consult OT/PT to assist with strengthening/mobility   - Keep Call bell within reach  - Keep bed low and locked with side rails adjusted as appropriate  - Keep care items and personal belongings within reach  - Initiate and maintain comfort rounds  - Make Fall Risk Sign visible to staff  - patient is continent, toileted on demand    - Apply yellow socks and bracelet for high fall risk patients  - Consider moving patient to room near nurses station  Outcome: Progressing     Problem: DISCHARGE PLANNING  Goal: Discharge to home or other facility with appropriate resources  Description: INTERVENTIONS:  - Identify barriers to discharge w/patient and caregiver  - Arrange for needed discharge resources and transportation as appropriate  - Identify discharge learning needs (meds, wound care, etc )  - Arrange for interpretive services to assist at discharge as needed  - Refer to Case Management Department for coordinating discharge planning if the patient needs post-hospital services based on physician/advanced practitioner order or complex needs related to functional status, cognitive ability, or social support system  Outcome: [address] appropriate cooling/warming therapies per order  - Administer medications as ordered  - Instruct and encourage patient and family to use good hand hygiene technique  - Identify and instruct in appropriate isolation precautions for identified infection/condition  Outcome: Progressing     Problem: PAIN - ADULT  Goal: Verbalizes/displays adequate comfort level or baseline comfort level  Description: Interventions:  - Encourage patient to monitor pain and request assistance  - Assess pain using appropriate pain scale  - Administer analgesics based on type and severity of pain and evaluate response  - Implement non-pharmacological measures as appropriate and evaluate response  - Consider cultural and social influences on pain and pain management  - Notify physician/advanced practitioner if interventions unsuccessful or patient reports new pain  Outcome: Progressing

## 2022-09-04 NOTE — ASSESSMENT & PLAN NOTE
· Patient reports on/off substernal chest pain w/o radiation for the past several days  Denies exertional or pleuritic component to the pain     · Troponin noted to be elevated at approx 60  · EKG w/o signs of ischemia  · Cardiology consulted planning for stress test on Tuesday  · Appreciate recommendations

## 2022-09-04 NOTE — PROGRESS NOTES
[facility]  Progress Note - Angel Cleary 1950, 66 y o  female MRN: [id number]  Unit/Bed#: -01 Encounter: [id number]  Primary Care Provider: YAMILE Royal   Date and time admitted to hospital: 9/2/2022  5:49 PM    * Chest pain  Assessment & Plan  · Patient reports on/off substernal chest pain w/o radiation for the past several days  Denies exertional or pleuritic component to the pain  · Troponin noted to be elevated at approx 60  · EKG w/o signs of ischemia  · Cardiology consulted planning for stress test on Tuesday  · Appreciate recommendations    Pain, dental  Assessment & Plan  · Reporting left sided dental pain   · Has seen outpatient dentis who prescribed PO abx w/ concern for developing abscess  · Reports current left mandibular pain at this time  · Minimal left mandibular swelling, w/o erythema, and w/minimal pain on palpation appreciated at this time  · Monitor off antibiotics    Diabetes mellitus ([facility] [address] )  Assessment & Plan    Lab Results   Component Value Date    HGBA1C 7 2 (H) 06/03/2022     ·   · Hold home metformin  · [employer] SSI  · Hypoglycemia protocol  · Diabetic diet     Hypertension  Assessment & Plan  /84  Suspecting pain contributing   Has since improved  Continue home lisinopril          VTE Pharmacologic Prophylaxis:   Pharmacologic: Heparin  Mechanical VTE Prophylaxis in Place: Yes    Patient Centered Rounds: I have performed bedside rounds with nursing staff today  Discussions with Specialists or Other Care Team Provider: chirag phillips    Education and Discussions with Family / Patient: pt    Time Spent for Care: 30 minutes  More than 50% of total time spent on counseling and coordination of care as described above      Current Length of Stay: 1 day(s)    Current Patient Status: Inpatient   Certification Statement: The patient will continue to require additional inpatient hospital stay due to see below    Discharge Plan: Pending stress test on Tuesday    Code Status: Level 1 - Full Code      Subjective:   Denies chest pain, shortness breath, cough, fevers, chills    Objective:     Vitals:   Temp (24hrs), Av 4 °F (36 9 °C), Min:98 °F (36 7 °C), Max:99 1 °F (37 3 °C)    Temp:  [98 °F (36 7 °C)-99 1 °F (37 3 °C)] 98 4 °F (36 9 °C)  HR:  [66-86] 82  Resp:  [19-21] 21  BP: (103-145)/() 145/103  SpO2:  [95 %-99 %] 95 %  Body mass index is 30 23 kg/m²  Input and Output Summary (last 24 hours): Intake/Output Summary (Last 24 hours) at 2022 0841  Last data filed at 9/3/2022 1715  Gross per 24 hour   Intake 580 ml   Output --   Net 580 ml       Physical Exam:     Physical Exam  Constitutional:       General: She is not in acute distress  Appearance: She is well-developed  She is not diaphoretic  HENT:      Head: Normocephalic and atraumatic  Nose: Nose normal       Mouth/Throat:      Pharynx: No oropharyngeal exudate  Eyes:      General: No scleral icterus  Right eye: No discharge  Left eye: No discharge  Conjunctiva/sclera: Conjunctivae normal    Neck:      Thyroid: No thyromegaly  Vascular: No JVD  Cardiovascular:      Rate and Rhythm: Normal rate and regular rhythm  Heart sounds: Normal heart sounds  No murmur heard  No friction rub  No gallop  Pulmonary:      Effort: Pulmonary effort is normal  No respiratory distress  Breath sounds: Normal breath sounds  No wheezing or rales  Chest:      Chest wall: No tenderness  Abdominal:      General: Bowel sounds are normal  There is no distension  Palpations: Abdomen is soft  Tenderness: There is no abdominal tenderness  There is no guarding or rebound  Musculoskeletal:         General: No tenderness or deformity  Normal range of motion  Cervical back: Normal range of motion and neck supple  Skin:     General: Skin is warm and dry  Findings: No erythema or rash     Neurological:      Mental Status: She is alert  Mental status is at baseline  Cranial Nerves: No cranial nerve deficit  Sensory: No sensory deficit  Motor: No abnormal muscle tone  Coordination: Coordination normal            Additional Data:     Labs:    Results from last 7 days   Lab Units 09/02/22  1812   WBC Thousand/uL 7 89   HEMOGLOBIN g/dL 13 4   HEMATOCRIT % 41 9   PLATELETS Thousands/uL 186   NEUTROS PCT % 71   LYMPHS PCT % 17   MONOS PCT % 9   EOS PCT % 2     Results from last 7 days   Lab Units 09/04/22  0521 09/02/22  1812   SODIUM mmol/L 137 139   POTASSIUM mmol/L 4 2 4 1   CHLORIDE mmol/L 100 101   CO2 mmol/L 29 30   BUN mg/dL 22 13   CREATININE mg/dL 0 73 0 86   ANION GAP mmol/L 8 8   CALCIUM mg/dL 8 8 9 1   ALBUMIN g/dL  --  3 5   TOTAL BILIRUBIN mg/dL  --  0 43   ALK PHOS U/L  --  79   ALT U/L  --  18   AST U/L  --  13   GLUCOSE RANDOM mg/dL 164* 193*         Results from last 7 days   Lab Units 09/04/22  0723 09/03/22  2050 09/03/22  1445 09/03/22  1103 09/03/22  0736 09/02/22  2159   POC GLUCOSE mg/dl 142* 180* 117 195* 175* 146*                   * I Have Reviewed All Lab Data Listed Above  * Additional Pertinent Lab Tests Reviewed: All Labs Within Last 24 Hours Reviewed    Imaging:    Imaging Reports Reviewed Today Include: na  Imaging Personally Reviewed by Myself Includes:  na    Recent Cultures (last 7 days):     Results from last 7 days   Lab Units 09/02/22  2112   BLOOD CULTURE  No Growth at 24 hrs  No Growth at 24 hrs         Last 24 Hours Medication List:   Current Facility-Administered Medications   Medication Dose Route Frequency Provider Last Rate    acetaminophen  650 mg Oral Q6H PRN Fabian Piña PA-C      ampicillin-sulbactam  3 g Intravenous Q6H Fabian Piña PA-C 3 g (09/04/22 0512)    enoxaparin  40 mg Subcutaneous Daily Marthachicho munson PA-C      hydrALAZINE  5 mg Intravenous Q6H PRN Martha Mike Kiser PA-C      hydrALAZINE  5 mg Intravenous Once Taylor Norman PA-C  HYDROmorphone  0 5 mg Intravenous Q6H PRN [facility], PA-C      HYDROmorphone  0 2 mg Intravenous Q6H PRN [place], [place]      insulin lispro  1-5 Units Subcutaneous TID AC [place], [place]      insulin lispro  1-5 Units Subcutaneous HS [place], [place]      ketorolac  15 mg Intravenous Q6H PRN [place], [place]      lisinopril  5 mg Oral Daily Pamela Lara [place]          Today, Patient Was Seen By: Sandor Mena MD    ** Please Note: Dictation voice to text software may have been used in the creation of this document   **

## 2022-09-05 LAB
ANION GAP SERPL CALCULATED.3IONS-SCNC: 8 MMOL/L (ref 4–13)
BUN SERPL-MCNC: 23 MG/DL (ref 5–25)
CALCIUM SERPL-MCNC: 8.8 MG/DL (ref 8.3–10.1)
CHLORIDE SERPL-SCNC: 102 MMOL/L (ref 96–108)
CO2 SERPL-SCNC: 28 MMOL/L (ref 21–32)
CREAT SERPL-MCNC: 0.71 MG/DL (ref 0.6–1.3)
GFR SERPL CREATININE-BSD FRML MDRD: 84 ML/MIN/1.73SQ M
GLUCOSE SERPL-MCNC: 159 MG/DL (ref 65–140)
GLUCOSE SERPL-MCNC: 166 MG/DL (ref 65–140)
GLUCOSE SERPL-MCNC: 180 MG/DL (ref 65–140)
GLUCOSE SERPL-MCNC: 187 MG/DL (ref 65–140)
GLUCOSE SERPL-MCNC: 202 MG/DL (ref 65–140)
POTASSIUM SERPL-SCNC: 4.2 MMOL/L (ref 3.5–5.3)
SODIUM SERPL-SCNC: 138 MMOL/L (ref 135–147)

## 2022-09-05 PROCEDURE — 82948 REAGENT STRIP/BLOOD GLUCOSE: CPT

## 2022-09-05 PROCEDURE — 99232 SBSQ HOSP IP/OBS MODERATE 35: CPT | Performed by: INTERNAL MEDICINE

## 2022-09-05 PROCEDURE — 80048 BASIC METABOLIC PNL TOTAL CA: CPT | Performed by: INTERNAL MEDICINE

## 2022-09-05 RX ORDER — AMOXICILLIN AND CLAVULANATE POTASSIUM 875; 125 MG/1; MG/1
1 TABLET, FILM COATED ORAL EVERY 12 HOURS SCHEDULED
Status: DISCONTINUED | OUTPATIENT
Start: 2022-09-05 | End: 2022-09-06 | Stop reason: HOSPADM

## 2022-09-05 RX ORDER — DOCUSATE SODIUM 100 MG/1
100 CAPSULE, LIQUID FILLED ORAL 2 TIMES DAILY
Status: DISCONTINUED | OUTPATIENT
Start: 2022-09-05 | End: 2022-09-06 | Stop reason: HOSPADM

## 2022-09-05 RX ADMIN — INSULIN LISPRO 1 UNITS: 100 INJECTION, SOLUTION INTRAVENOUS; SUBCUTANEOUS at 12:08

## 2022-09-05 RX ADMIN — INSULIN LISPRO 1 UNITS: 100 INJECTION, SOLUTION INTRAVENOUS; SUBCUTANEOUS at 08:00

## 2022-09-05 RX ADMIN — ENOXAPARIN SODIUM 40 MG: 40 INJECTION SUBCUTANEOUS at 09:13

## 2022-09-05 RX ADMIN — HYDROMORPHONE HYDROCHLORIDE 0.2 MG: 0.2 INJECTION, SOLUTION INTRAMUSCULAR; INTRAVENOUS; SUBCUTANEOUS at 03:11

## 2022-09-05 RX ADMIN — DOCUSATE SODIUM 100 MG: 100 CAPSULE, LIQUID FILLED ORAL at 17:40

## 2022-09-05 RX ADMIN — HYDROMORPHONE HYDROCHLORIDE 0.5 MG: 1 INJECTION, SOLUTION INTRAMUSCULAR; INTRAVENOUS; SUBCUTANEOUS at 16:23

## 2022-09-05 RX ADMIN — LISINOPRIL 5 MG: 5 TABLET ORAL at 09:13

## 2022-09-05 RX ADMIN — SODIUM CHLORIDE 3 G: 9 INJECTION, SOLUTION INTRAVENOUS at 00:06

## 2022-09-05 RX ADMIN — AMOXICILLIN AND CLAVULANATE POTASSIUM 1 TABLET: 875; 125 TABLET, FILM COATED ORAL at 21:26

## 2022-09-05 RX ADMIN — DOCUSATE SODIUM 100 MG: 100 CAPSULE, LIQUID FILLED ORAL at 09:13

## 2022-09-05 RX ADMIN — SODIUM CHLORIDE 3 G: 9 INJECTION, SOLUTION INTRAVENOUS at 06:01

## 2022-09-05 RX ADMIN — INSULIN LISPRO 1 UNITS: 100 INJECTION, SOLUTION INTRAVENOUS; SUBCUTANEOUS at 21:26

## 2022-09-05 RX ADMIN — HYDROMORPHONE HYDROCHLORIDE 0.2 MG: 0.2 INJECTION, SOLUTION INTRAMUSCULAR; INTRAVENOUS; SUBCUTANEOUS at 09:16

## 2022-09-05 RX ADMIN — INSULIN LISPRO 1 UNITS: 100 INJECTION, SOLUTION INTRAVENOUS; SUBCUTANEOUS at 16:26

## 2022-09-05 RX ADMIN — HYDROMORPHONE HYDROCHLORIDE 0.2 MG: 0.2 INJECTION, SOLUTION INTRAMUSCULAR; INTRAVENOUS; SUBCUTANEOUS at 21:26

## 2022-09-05 RX ADMIN — ACETAMINOPHEN 650 MG: 325 TABLET ORAL at 07:27

## 2022-09-05 NOTE — PLAN OF CARE
Problem: PAIN - ADULT  Goal: Verbalizes/displays adequate comfort level or baseline comfort level  Description: Interventions:  - Encourage patient to monitor pain and request assistance  - Assess pain using appropriate pain scale  - Administer analgesics based on type and severity of pain and evaluate response  - Implement non-pharmacological measures as appropriate and evaluate response  - Consider cultural and social influences on pain and pain management  - Notify physician/advanced practitioner if interventions unsuccessful or patient reports new pain  Outcome: Progressing     Problem: SAFETY ADULT  Goal: Patient will remain free of falls  Description: INTERVENTIONS:  - Educate patient/family on patient safety including physical limitations  - Instruct patient to call for assistance with activity   - Consult OT/PT to assist with strengthening/mobility   - Keep Call bell within reach  - Keep bed low and locked with side rails adjusted as appropriate  - Keep care items and personal belongings within reach  - Initiate and maintain comfort rounds  - Make Fall Risk Sign visible to staff  - Apply yellow socks and bracelet for high fall risk patients  - Consider moving patient to room near nurses station  Outcome: Progressing     Problem: DISCHARGE PLANNING  Goal: Discharge to home or other facility with appropriate resources  Description: INTERVENTIONS:  - Identify barriers to discharge w/patient and caregiver  - Arrange for needed discharge resources and transportation as appropriate  - Identify discharge learning needs (meds, wound care, etc )  - Arrange for interpretive services to assist at discharge as needed  - Refer to Case Management Department for coordinating discharge planning if the patient needs post-hospital services based on physician/advanced practitioner order or complex needs related to functional status, cognitive ability, or social support system  Outcome: Progressing     Problem: Knowledge Deficit  Goal: Patient/family/caregiver demonstrates understanding of disease process, treatment plan, medications, and discharge instructions  Description: Complete learning assessment and assess knowledge base    Interventions:  - Provide teaching at level of understanding  - Provide teaching via preferred learning methods  Outcome: Progressing     Problem: METABOLIC, FLUID AND ELECTROLYTES - ADULT  Goal: Glucose maintained within target range  Description: INTERVENTIONS:  - Monitor Blood Glucose as ordered  - Assess for signs and symptoms of hyperglycemia and hypoglycemia  - Administer ordered medications to maintain glucose within target range  - Assess nutritional intake and initiate nutrition service referral as needed  Outcome: Progressing     Problem: INFECTION - ADULT  Goal: Absence or prevention of progression during hospitalization  Description: INTERVENTIONS:  - Assess and monitor for signs and symptoms of infection  - Monitor lab/diagnostic results  - Monitor all insertion sites, i e  indwelling lines, tubes, and drains  - Monitor endotracheal if appropriate and nasal secretions for changes in amount and color  - [place] appropriate cooling/warming therapies per order  - Administer medications as ordered  - Instruct and encourage patient and family to use good hand hygiene technique  - Identify and instruct in appropriate isolation precautions for identified infection/condition  Outcome: Progressing

## 2022-09-05 NOTE — PLAN OF CARE
Problem: SAFETY ADULT  Goal: Patient will remain free of falls  Description: INTERVENTIONS:  - Educate patient/family on patient safety including physical limitations  - Instruct patient to call for assistance with activity   - Consult OT/PT to assist with strengthening/mobility   - Keep Call bell within reach  - Keep bed low and locked with side rails adjusted as appropriate  - Keep care items and personal belongings within reach  - Initiate and maintain comfort rounds  - Make Fall Risk Sign visible to staff  - Apply yellow socks and bracelet for high fall risk patients  - Consider moving patient to room near nurses station  Outcome: Progressing     Problem: PAIN - ADULT  Goal: Verbalizes/displays adequate comfort level or baseline comfort level  Description: Interventions:  - Encourage patient to monitor pain and request assistance  - Assess pain using appropriate pain scale  - Administer analgesics based on type and severity of pain and evaluate response  - Implement non-pharmacological measures as appropriate and evaluate response  - Consider cultural and social influences on pain and pain management  - Notify physician/advanced practitioner if interventions unsuccessful or patient reports new pain  Outcome: Progressing

## 2022-09-05 NOTE — PROGRESS NOTES
General Cardiology   Progress Note   Opal Doherty 66 y o  female MRN: [id number]  Unit/Bed#: -01 Encounter: [id number]      SUBJECTIVE:   No significant events overnight  Patient denies further chest pain  Patient is on antibiotics presently oral for her dental abscess  REVIEW OF SYSTEMS:  Constitutional:  Denies fever or chills   Eyes:  Denies change in visual acuity   HENT:  Denies nasal congestion or sore throat   Respiratory:  Denies cough or shortness of breath   Cardiovascular:  Denies chest pain or edema   GI:  Denies abdominal pain, nausea, vomiting, bloody stools or diarrhea   :  Denies dysuria, frequency, difficulty in micturition and nocturia  Musculoskeletal:  Denies back pain or joint pain   Neurologic:  Denies headache, focal weakness or sensory changes   Endocrine:  Denies polyuria or polydipsia   Lymphatic:  Denies swollen glands   Psychiatric:  Denies depression or anxiety     OBJECTIVE:   Vitals:  Vitals:    09/05/22 0731   BP: 165/89   Pulse: 79   Resp:    Temp: 98 9 °F (37 2 °C)   SpO2: 97%     Body mass index is 30 23 kg/m²  Systolic (19DPA), KLI:972 , Min:114 , GOY:231     Diastolic (49KQZ), GXM:94, Min:72, Max:103      Intake/Output Summary (Last 24 hours) at 9/5/2022 0951  Last data filed at 9/5/2022 0843  Gross per 24 hour   Intake 560 ml   Output 1300 ml   Net -740 ml     Weight (last 2 days)       Date/Time Weight    09/04/22 1120 72 6 (160)                PHYSICAL EXAMS:  General:  Patient is not in acute distress, laying in the bed comfortably, awake, alert responding to commands  Head: Normocephalic, Atraumatic  HEENT:  Both pupils normal-size atraumatic, normocephalic, nonicteric  Neck:  JVP not raised  Trachea central  Respiratory:  Bronchovascular breathing all over the chest without any accompaniment  Cardiovascular:  Regular rate and rhythm no S3 2/6 systolic ejection murmur in the right sternal border  GI:  Abdomen soft nontender   Liver and spleen normal size  Lymphatic:  No cervical or inguinal lymphadenopathy  Neurologic:  Patient is awake alert, responding to command, well-oriented to time and place and person moving   Extremities no edema  LABORATORY RESULTS:        CBC with diff:   Results from last 7 days   Lab Units 09/02/22  1812   WBC Thousand/uL 7 89   HEMOGLOBIN g/dL 13 4   HEMATOCRIT % 41 9   MCV fL 90   PLATELETS Thousands/uL 186   MCH pg 28 6   MCHC g/dL 32 0   RDW % 13 5   MPV fL 12 7   NRBC AUTO /100 WBCs 0       CMP:  Results from last 7 days   Lab Units 09/05/22  0526 09/04/22  0521 09/02/22  1812   POTASSIUM mmol/L 4 2 4 2 4 1   CHLORIDE mmol/L 102 100 101   CO2 mmol/L 28 29 30   BUN mg/dL 23 22 13   CREATININE mg/dL 0 71 0 73 0 86   CALCIUM mg/dL 8 8 8 8 9 1   AST U/L  --   --  13   ALT U/L  --   --  18   ALK PHOS U/L  --   --  79   EGFR ml/min/1 73sq m 84 81 67       BMP:  Results from last 7 days   Lab Units 09/05/22 0526 09/04/22  0521 09/02/22  1812   POTASSIUM mmol/L 4 2 4 2 4 1   CHLORIDE mmol/L 102 100 101   CO2 mmol/L 28 29 30   BUN mg/dL 23 22 13   CREATININE mg/dL 0 71 0 73 0 86   CALCIUM mg/dL 8 8 8 8 9 1                              Lipid Profile:   No results found for: CHOL  No results found for: HDL  No results found for: LDLCALC  No results found for: TRIG    Cardiac testing:  No results found for this or any previous visit  No results found for this or any previous visit  No results found for this or any previous visit  No valid procedures specified  No results found for this or any previous visit        Meds/Allergies   all current active meds have been reviewed  Medications Prior to Admission   Medication    lisinopril (ZESTRIL) 5 mg tablet    metFORMIN (GLUCOPHAGE) 500 mg tablet    Cetirizine HCl 10 MG CAPS    desonide (DESOWEN) 0 05 % ointment          ASSESSMENT & PLAN   Principal Problem:    Chest pain  Active Problems:    Hypertension    Diabetes mellitus ([facility] [address] )    Dental abscess       Patient with intermittent chest pain with history of hypertension and diabetes  Patient also being treated for dental abscess  Patient will be scheduled for pharmacological nuclear stress test to assess for ischemia  Echocardiogram shows normal ejection fraction with mild aortic stenosis as well as mild mitral regurgitation which was reviewed with the patient  Importance of compliance with medication and symptoms to watch out from cardiac standpoint which would indicate the need for further cardiac evaluation also discussed with patient  Patient is agreeable the plan of care  Heath Newell MD  9/5/2022,9:51 AM    Portions of the record may have been created with voice recognition software  Occasional wrong word or "sound a like" substitutions may have occurred due to the inherent limitations of voice recognition software  Read the chart carefully and recognize, using context, where substitutions have occurred

## 2022-09-05 NOTE — PROGRESS NOTES
[facility]  Progress Note - Penny Fam 1950, 66 y o  female MRN: [id number]  Unit/Bed#: -01 Encounter: [id number]  Primary Care Provider: YAMILE Smith   Date and time admitted to hospital: 9/2/2022  5:49 PM    * Chest pain  Assessment & Plan  · Patient reports on/off substernal chest pain w/o radiation for the past several days  Denies exertional or pleuritic component to the pain  · Troponin noted to be elevated at approx 60  · EKG w/o signs of ischemia  · Cardiology consulted planning for stress test on Tuesday  · Appreciate recommendations    Pain, dental  Assessment & Plan  · Reporting left sided dental pain   · Has seen outpatient dentis who prescribed PO abx w/ concern for developing abscess  · Reports current left mandibular pain at this time  · Minimal left mandibular swelling, w/o erythema, and w/minimal pain on palpation appreciated at this time  · Monitor off antibiotics    Diabetes mellitus ([facility] [address] )  Assessment & Plan    Lab Results   Component Value Date    HGBA1C 7 2 (H) 06/03/2022     · Hold home metformin  · Correctional SSI  · Hypoglycemia protocol  · Diabetic diet     Hypertension  Assessment & Plan  /84 initally  Suspecting pain contributing   Has since improved  Continue home lisinopril        VTE Pharmacologic Prophylaxis:   Pharmacologic: Enoxaparin (Lovenox)  Mechanical VTE Prophylaxis in Place: Yes    Patient Centered Rounds: I have performed bedside rounds with nursing staff today  Discussions with Specialists or Other Care Team Provider: valdemar phillips    Education and Discussions with Family / Patient: pt    Time Spent for Care: 30 minutes  More than 50% of total time spent on counseling and coordination of care as described above      Current Length of Stay: 2 day(s)    Current Patient Status: Inpatient   Certification Statement: The patient will continue to require additional inpatient hospital stay due to See below    Discharge Plan:  Pending stress test planned on Tuesday    Code Status: Level 1 - Full Code      Subjective:   Denies chest pain, shortness breath, cough, fevers, chills    Objective:     Vitals:   Temp (24hrs), Av 4 °F (36 9 °C), Min:98 °F (36 7 °C), Max:98 9 °F (37 2 °C)    Temp:  [98 °F (36 7 °C)-98 9 °F (37 2 °C)] 98 9 °F (37 2 °C)  HR:  [68-84] 79  Resp:  [19] 19  BP: (114-165)/() 165/89  SpO2:  [95 %-97 %] 97 %  Body mass index is 30 23 kg/m²  Input and Output Summary (last 24 hours): Intake/Output Summary (Last 24 hours) at 2022 0834  Last data filed at 2022 3140  Gross per 24 hour   Intake 680 ml   Output 1300 ml   Net -620 ml       Physical Exam:     Physical Exam  Constitutional:       General: She is not in acute distress  Appearance: She is well-developed  She is not diaphoretic  HENT:      Head: Normocephalic and atraumatic  Nose: Nose normal       Mouth/Throat:      Pharynx: No oropharyngeal exudate  Eyes:      General: No scleral icterus  Right eye: No discharge  Left eye: No discharge  Conjunctiva/sclera: Conjunctivae normal    Neck:      Thyroid: No thyromegaly  Vascular: No JVD  Cardiovascular:      Rate and Rhythm: Normal rate and regular rhythm  Heart sounds: Normal heart sounds  No murmur heard  No friction rub  No gallop  Pulmonary:      Effort: Pulmonary effort is normal  No respiratory distress  Breath sounds: Normal breath sounds  No wheezing or rales  Chest:      Chest wall: No tenderness  Abdominal:      General: Bowel sounds are normal  There is no distension  Palpations: Abdomen is soft  Tenderness: There is no abdominal tenderness  There is no guarding or rebound  Musculoskeletal:         General: No tenderness or deformity  Normal range of motion  Cervical back: Normal range of motion and neck supple  Skin:     General: Skin is warm and dry  Findings: No erythema or rash  Neurological:      Mental Status: She is alert  Mental status is at baseline  Cranial Nerves: No cranial nerve deficit  Sensory: No sensory deficit  Motor: No abnormal muscle tone  Coordination: Coordination normal            Additional Data:     Labs:    Results from last 7 days   Lab Units 09/02/22  1812   WBC Thousand/uL 7 89   HEMOGLOBIN g/dL 13 4   HEMATOCRIT % 41 9   PLATELETS Thousands/uL 186   NEUTROS PCT % 71   LYMPHS PCT % 17   MONOS PCT % 9   EOS PCT % 2     Results from last 7 days   Lab Units 09/05/22  0526 09/04/22  0521 09/02/22  1812   SODIUM mmol/L 138   < > 139   POTASSIUM mmol/L 4 2   < > 4 1   CHLORIDE mmol/L 102   < > 101   CO2 mmol/L 28   < > 30   BUN mg/dL 23   < > 13   CREATININE mg/dL 0 71   < > 0 86   ANION GAP mmol/L 8   < > 8   CALCIUM mg/dL 8 8   < > 9 1   ALBUMIN g/dL  --   --  3 5   TOTAL BILIRUBIN mg/dL  --   --  0 43   ALK PHOS U/L  --   --  79   ALT U/L  --   --  18   AST U/L  --   --  13   GLUCOSE RANDOM mg/dL 166*   < > 193*    < > = values in this interval not displayed  Results from last 7 days   Lab Units 09/05/22  0727 09/04/22  2104 09/04/22  1105 09/04/22  0723 09/03/22  2050 09/03/22  1445 09/03/22  1103 09/03/22  0736 09/02/22  2159   POC GLUCOSE mg/dl 180* 163* 193* 142* 180* 117 195* 175* 146*                   * I Have Reviewed All Lab Data Listed Above  * Additional Pertinent Lab Tests Reviewed: All Labs Within Last 24 Hours Reviewed    Imaging:    Imaging Reports Reviewed Today Include: na  Imaging Personally Reviewed by Myself Includes:  na    Recent Cultures (last 7 days):     Results from last 7 days   Lab Units 09/02/22  2112   BLOOD CULTURE  No Growth at 48 hrs  No Growth at 48 hrs         Last 24 Hours Medication List:   Current Facility-Administered Medications   Medication Dose Route Frequency Provider Last Rate    acetaminophen  650 mg Oral Q6H PRN Gerardo Betts PA-C      ampicillin-sulbactam  3 g Intravenous Q6H Sharyn Vila Shaune Curling, PA-C 3 g (09/05/22 0601)    docusate sodium  100 mg Oral BID [facility]      enoxaparin  40 mg Subcutaneous Daily [place], [place]      hydrALAZINE  5 mg Intravenous Q6H PRN [place], [place]      hydrALAZINE  5 mg Intravenous Once Jean Carlos Mack PA-C      HYDROmorphone  0 5 mg Intravenous Q6H PRN [place], [place]      HYDROmorphone  0 2 mg Intravenous Q6H PRN Rehan [place], [place]      insulin lispro  1-5 Units Subcutaneous TID AC Rehan [place], [place]      insulin lispro  1-5 Units Subcutaneous HS [place], [place]      lisinopril  5 mg Oral Daily [place], [place]          Today, Patient Was Seen By: Juan Santana MD    ** Please Note: Dictation voice to text software may have been used in the creation of this document   **

## 2022-09-05 NOTE — ASSESSMENT & PLAN NOTE
Lab Results   Component Value Date    HGBA1C 7 2 (H) 06/03/2022     · Hold home metformin  · Correctional SSI  · Hypoglycemia protocol  · Diabetic diet

## 2022-09-06 ENCOUNTER — APPOINTMENT (INPATIENT)
Dept: NON INVASIVE DIAGNOSTICS | Facility: HOSPITAL | Age: 72
DRG: 158 | End: 2022-09-06
Payer: MEDICARE

## 2022-09-06 VITALS
BODY MASS INDEX: 30.21 KG/M2 | HEIGHT: 61 IN | TEMPERATURE: 98.3 F | RESPIRATION RATE: 19 BRPM | DIASTOLIC BLOOD PRESSURE: 89 MMHG | HEART RATE: 80 BPM | OXYGEN SATURATION: 94 % | SYSTOLIC BLOOD PRESSURE: 133 MMHG | WEIGHT: 160 LBS

## 2022-09-06 LAB
ANION GAP SERPL CALCULATED.3IONS-SCNC: 7 MMOL/L (ref 4–13)
ARRHY DURING EX: NORMAL
BUN SERPL-MCNC: 14 MG/DL (ref 5–25)
CALCIUM SERPL-MCNC: 9.2 MG/DL (ref 8.3–10.1)
CHEST PAIN STATEMENT: NORMAL
CHLORIDE SERPL-SCNC: 103 MMOL/L (ref 96–108)
CO2 SERPL-SCNC: 29 MMOL/L (ref 21–32)
CREAT SERPL-MCNC: 0.74 MG/DL (ref 0.6–1.3)
GFR SERPL CREATININE-BSD FRML MDRD: 80 ML/MIN/1.73SQ M
GLUCOSE SERPL-MCNC: 166 MG/DL (ref 65–140)
GLUCOSE SERPL-MCNC: 175 MG/DL (ref 65–140)
GLUCOSE SERPL-MCNC: 183 MG/DL (ref 65–140)
GLUCOSE SERPL-MCNC: 254 MG/DL (ref 65–140)
MAX DIASTOLIC BP: 60 MMHG
MAX HEART RATE: 136 BPM
MAX HR PERCENT: 92 %
MAX HR: 136 BPM
MAX PREDICTED HEART RATE: 147 BPM
MAX. SYSTOLIC BP: 180 MMHG
POTASSIUM SERPL-SCNC: 4.4 MMOL/L (ref 3.5–5.3)
PROTOCOL NAME: NORMAL
RATE PRESSURE PRODUCT: NORMAL
REASON FOR TERMINATION: NORMAL
SL CV LV EF: 55
SL CV STRESS RECOVERY BP: NORMAL MMHG
SL CV STRESS RECOVERY HR: 80 BPM
SL CV STRESS STAGE REACHED: 2
SODIUM SERPL-SCNC: 139 MMOL/L (ref 135–147)
STRESS BASELINE BP: NORMAL MMHG
STRESS BASELINE HR: 68 BPM
STRESS O2 SAT REST: 98 %
STRESS PEAK HR: 136 BPM
STRESS POST ESTIMATED WORKLOAD: 7 METS
STRESS POST EXERCISE DUR MIN: 4 MIN
STRESS POST EXERCISE DUR SEC: 47 SEC
STRESS POST O2 SAT PEAK: 99 %
STRESS POST PEAK BP: 180 MMHG
STRESS ST DEPRESSION: 0 MM
TARGET HR FORMULA: NORMAL
TEST INDICATION: NORMAL
TIME IN EXERCISE PHASE: NORMAL

## 2022-09-06 PROCEDURE — 99239 HOSP IP/OBS DSCHRG MGMT >30: CPT | Performed by: STUDENT IN AN ORGANIZED HEALTH CARE EDUCATION/TRAINING PROGRAM

## 2022-09-06 PROCEDURE — 93350 STRESS TTE ONLY: CPT

## 2022-09-06 PROCEDURE — 99232 SBSQ HOSP IP/OBS MODERATE 35: CPT | Performed by: INTERNAL MEDICINE

## 2022-09-06 PROCEDURE — 82948 REAGENT STRIP/BLOOD GLUCOSE: CPT

## 2022-09-06 PROCEDURE — 93350 STRESS TTE ONLY: CPT | Performed by: INTERNAL MEDICINE

## 2022-09-06 PROCEDURE — 99232 SBSQ HOSP IP/OBS MODERATE 35: CPT | Performed by: STUDENT IN AN ORGANIZED HEALTH CARE EDUCATION/TRAINING PROGRAM

## 2022-09-06 PROCEDURE — 80048 BASIC METABOLIC PNL TOTAL CA: CPT | Performed by: INTERNAL MEDICINE

## 2022-09-06 RX ORDER — OXYCODONE HYDROCHLORIDE 5 MG/1
5 TABLET ORAL EVERY 6 HOURS PRN
Qty: 10 TABLET | Refills: 0 | Status: SHIPPED | OUTPATIENT
Start: 2022-09-06 | End: 2022-09-16

## 2022-09-06 RX ADMIN — DOCUSATE SODIUM 100 MG: 100 CAPSULE, LIQUID FILLED ORAL at 08:22

## 2022-09-06 RX ADMIN — AMOXICILLIN AND CLAVULANATE POTASSIUM 1 TABLET: 875; 125 TABLET, FILM COATED ORAL at 08:21

## 2022-09-06 RX ADMIN — INSULIN LISPRO 1 UNITS: 100 INJECTION, SOLUTION INTRAVENOUS; SUBCUTANEOUS at 08:23

## 2022-09-06 RX ADMIN — ENOXAPARIN SODIUM 40 MG: 40 INJECTION SUBCUTANEOUS at 08:23

## 2022-09-06 RX ADMIN — LISINOPRIL 5 MG: 5 TABLET ORAL at 08:21

## 2022-09-06 NOTE — PROGRESS NOTES
[facility]  Progress Note - Jenny Console 1950, 66 y o  female MRN: [id number]  Unit/Bed#: -01 Encounter: [id number]  Primary Care Provider: YAMILE Stringer   Date and time admitted to hospital: 9/2/2022  5:49 PM    * Chest pain  Assessment & Plan  · Patient reports on/off substernal chest pain w/o radiation for the past several days  Denies exertional or pleuritic component to the pain  · Troponin noted to be elevated at approx 60  · EKG w/o signs of ischemia  · Cardiology was consulted, completed stress echo since she declined nuclear stress test  · Follow up results of stress echo    Pain, dental  Assessment & Plan  · Reporting left sided dental pain   · Has seen outpatient dentis who prescribed PO abx w/ concern for developing abscess  · Reports current left mandibular pain at this time  · Minimal left mandibular swelling, w/o erythema, and w/minimal pain on palpation appreciated at this time  · Monitor off antibiotics    Diabetes mellitus ([facility] [address] )  Assessment & Plan    Lab Results   Component Value Date    HGBA1C 7 2 (H) 06/03/2022     · Hold home metformin  · Correctional SSI  · Hypoglycemia protocol  · Diabetic diet   · Controlled    Hypertension  Assessment & Plan  · Better controlled now  · Continue home meds  · Monitor         VTE Pharmacologic Prophylaxis: VTE Score: 3 Moderate Risk (Score 3-4) - Pharmacological DVT Prophylaxis Ordered: enoxaparin (Lovenox)  Patient Centered Rounds: I performed bedside rounds with nursing staff today  Discussions with Specialists or Other Care Team Provider: cardiology         Time Spent for Care: 30 minutes  More than 50% of total time spent on counseling and coordination of care as described above      Current Length of Stay: 3 day(s)  Current Patient Status: Inpatient   Certification Statement: The patient will continue to require additional inpatient hospital stay due to stress echo  Discharge Plan: Anticipate discharge tomorrow to home  Code Status: Level 1 - Full Code    Subjective:   Patient feels better today     Objective:     Vitals:   Temp (24hrs), Av 4 °F (36 9 °C), Min:98 1 °F (36 7 °C), Max:98 9 °F (37 2 °C)    Temp:  [98 1 °F (36 7 °C)-98 9 °F (37 2 °C)] 98 2 °F (36 8 °C)  HR:  [68-83] 72  Resp:  [19] 19  BP: (111-169)/(83-89) 111/88  SpO2:  [95 %-98 %] 98 %  Body mass index is 30 23 kg/m²  Input and Output Summary (last 24 hours): Intake/Output Summary (Last 24 hours) at 2022 1327  Last data filed at 2022 0510  Gross per 24 hour   Intake 300 ml   Output 1300 ml   Net -1000 ml       Physical Exam:   Physical Exam  Constitutional:       General: She is not in acute distress  Appearance: Normal appearance  She is not toxic-appearing  Cardiovascular:      Rate and Rhythm: Normal rate and regular rhythm  Heart sounds: Normal heart sounds  No murmur heard  Pulmonary:      Effort: Pulmonary effort is normal  No respiratory distress  Breath sounds: Normal breath sounds  No wheezing  Abdominal:      General: Abdomen is flat  There is no distension  Palpations: Abdomen is soft  Tenderness: There is no abdominal tenderness  Neurological:      General: No focal deficit present  Mental Status: She is alert and oriented to person, place, and time  Mental status is at baseline  Motor: No weakness            Additional Data:     Labs:  Results from last 7 days   Lab Units 22  1812   WBC Thousand/uL 7 89   HEMOGLOBIN g/dL 13 4   HEMATOCRIT % 41 9   PLATELETS Thousands/uL 186   NEUTROS PCT % 71   LYMPHS PCT % 17   MONOS PCT % 9   EOS PCT % 2     Results from last 7 days   Lab Units 22  0445 22  0521 22  1812   SODIUM mmol/L 139   < > 139   POTASSIUM mmol/L 4 4   < > 4 1   CHLORIDE mmol/L 103   < > 101   CO2 mmol/L 29   < > 30   BUN mg/dL 14   < > 13   CREATININE mg/dL 0 74   < > 0 86   ANION GAP mmol/L 7   < > 8   CALCIUM mg/dL 9 2   < > 9 1 ALBUMIN g/dL  --   --  3 5   TOTAL BILIRUBIN mg/dL  --   --  0 43   ALK PHOS U/L  --   --  79   ALT U/L  --   --  18   AST U/L  --   --  13   GLUCOSE RANDOM mg/dL 175*   < > 193*    < > = values in this interval not displayed  Results from last 7 days   Lab Units 09/06/22  1136 09/06/22  0757 09/05/22 2057 09/05/22  1556 09/05/22  1116 09/05/22  0727 09/04/22  2104 09/04/22  1105 09/04/22  0723 09/03/22 2050 09/03/22  1445 09/03/22  1103   POC GLUCOSE mg/dl 254* 166* 202* 159* 187* 180* 163* 193* 142* 180* 117 195*               Lines/Drains:  Invasive Devices  Report    Peripheral Intravenous Line  Duration           Peripheral IV 09/02/22 Proximal;Right;Ventral (anterior) Forearm 3 days                  Telemetry:  Telemetry Orders (From admission, onward)             48 Hour Telemetry Monitoring  Continuous x 48 hours        References:    Telemetry Guidelines   Question:  Reason for 48 Hour Telemetry  Answer:  Acute MI, chest pain - R/O MI, or unstable angina                            Imaging: No pertinent imaging reviewed  Recent Cultures (last 7 days):   Results from last 7 days   Lab Units 09/02/22 2112   BLOOD CULTURE  No Growth at 72 hrs  No Growth at 72 hrs         Last 24 Hours Medication List:   Current Facility-Administered Medications   Medication Dose Route Frequency Provider Last Rate    acetaminophen  650 mg Oral Q6H PRN Gerardo Betts PA-C      amoxicillin-clavulanate  1 tablet Oral Q12H [address] Haleigh Ruiz MD      docusate sodium  100 mg Oral BID Gerardo Betts PA-C      enoxaparin  40 mg Subcutaneous Daily Cyn Renteria      hydrALAZINE  5 mg Intravenous Q6H PRN Ellan Schwab [place], PA-C      hydrALAZINE  5 mg Intravenous Once Yadira Rebolledo PA-C      HYDROmorphone  0 5 mg Intravenous Q6H PRN Ellan Schwab [place], PA-C      HYDROmorphone  0 2 mg Intravenous Q6H PRN Ellan Schwab [place], PA-C      insulin lispro  1-5 Units Subcutaneous TID [facility] [place], [place]  insulin lispro  1-5 Units Subcutaneous HS [facility]iy [facility]      lisinopril  5 mg Oral Daily Jannetteismael Benjamin [place]          Today, Patient Was Seen By: Tomer Isaac MD    **Please Note: This note may have been constructed using a voice recognition system  **

## 2022-09-06 NOTE — PROGRESS NOTES
Cardiology Progress Note - Gamal Joseph 66 y o  female MRN: [id number]    Unit/Bed#: -01 Encounter: [id number]      Assessment/Plan:  1  Chest pain  · HS troponin 60, 60, 61  · ECG without ischemic changes  · Patient declined pharmacologic MPI, agreeable to stress echo  · Stress echo negative for ischemia  2  Left mandibular abscess  · Management per primary team    3  Hypertension  · Continue lisinopril 5 mg daily    4  Diabetes type 2  · A1c 7 2, management per primary team    5  NSVT  · 1 episode noted on 9/4, negative ischemic evaluation    Will see as needed  Please reach out with any questions or concerns  Subjective:   Patient seen and examined  No significant events overnight  Patient denies any chest pain, shortness of breath, palpitations, or lower extremity edema  Objective:     Vitals: Blood pressure 133/89, pulse 80, temperature 98 3 °F (36 8 °C), resp  rate 19, height 5' 1" (1 549 m), weight 72 6 kg (160 lb), SpO2 94 %  , Body mass index is 30 23 kg/m² ,   Orthostatic Blood Pressures    Flowsheet Row Most Recent Value   Blood Pressure 133/89 filed at 09/06/2022 1353   Patient Position - Orthostatic VS Sitting filed at 09/02/2022 1809            Intake/Output Summary (Last 24 hours) at 9/6/2022 1637  Last data filed at 9/6/2022 0510  Gross per 24 hour   Intake 300 ml   Output 1300 ml   Net -1000 ml         Physical Exam:  Physical Exam  Vitals and nursing note reviewed  Constitutional:       General: She is not in acute distress  Appearance: She is well-developed  HENT:      Head: Normocephalic and atraumatic  Eyes:      Conjunctiva/sclera: Conjunctivae normal    Cardiovascular:      Rate and Rhythm: Normal rate and regular rhythm  Heart sounds: No murmur heard  Pulmonary:      Effort: Pulmonary effort is normal  No respiratory distress  Breath sounds: Normal breath sounds  Abdominal:      Palpations: Abdomen is soft  Tenderness:  There is no abdominal tenderness  Musculoskeletal:      Cervical back: Neck supple  Skin:     General: Skin is warm and dry  Neurological:      Mental Status: She is alert and oriented to person, place, and time     Psychiatric:         Mood and Affect: Mood normal          Behavior: Behavior normal               Medications:      Current Facility-Administered Medications:     acetaminophen (TYLENOL) tablet 650 mg, 650 mg, Oral, Q6H PRN, Tuan Valle PA-C, 650 mg at 09/05/22 0475    amoxicillin-clavulanate (AUGMENTIN) 875-125 mg per tablet 1 tablet, 1 tablet, Oral, Q12H [address], Tam Pace MD, 1 tablet at 09/06/22 0821    docusate sodium (COLACE) capsule 100 mg, 100 mg, Oral, BID, Tuan Valle PA-C, 100 mg at 09/06/22 9374    enoxaparin (LOVENOX) subcutaneous injection 40 mg, 40 mg, Subcutaneous, Daily, Tuan Valle PA-C, 40 mg at 09/06/22 7070    hydrALAZINE (APRESOLINE) injection 5 mg, 5 mg, Intravenous, Q6H PRN, Tuan Valle PA-C, 5 mg at 09/02/22 2137    hydrALAZINE (APRESOLINE) injection 5 mg, 5 mg, Intravenous, Once, Joseph Odom PA-C    HYDROmorphone (DILAUDID) injection 0 5 mg, 0 5 mg, Intravenous, Q6H PRN, Tuan Valle PA-C, 0 5 mg at 09/05/22 1623    HYDROmorphone HCl (DILAUDID) injection 0 2 mg, 0 2 mg, Intravenous, Q6H PRN, Tuan Valle PA-C, 0 2 mg at 09/05/22 2126    insulin lispro (HumaLOG) 100 units/mL subcutaneous injection 1-5 Units, 1-5 Units, Subcutaneous, TID AC, 1 Units at 09/06/22 0823 **AND** Fingerstick Glucose (POCT), , , 4x Daily AC and at bedtime, Tuan Valle PA-C    insulin lispro (HumaLOG) 100 units/mL subcutaneous injection 1-5 Units, 1-5 Units, Subcutaneous, HS, Tuan Valle PA-C, 1 Units at 09/05/22 2126    lisinopril (ZESTRIL) tablet 5 mg, 5 mg, Oral, Daily, Becca Gurrola [place], PA-, 5 mg at 09/06/22 0502     Labs & Results:     Results from last 7 days   Lab Units 09/02/22 2230 09/02/22 2112 09/02/22  1812   HS TNI 0HR ng/L  --   --  60*   HS TNI 2HR ng/L  --  60*  --    HSTNI D2 ng/L  --  0  --    HS TNI 4HR ng/L 61*  --   --    HSTNI D4 ng/L 1  --   --      Results from last 7 days   Lab Units 09/02/22  1812   WBC Thousand/uL 7 89   HEMOGLOBIN g/dL 13 4   HEMATOCRIT % 41 9   PLATELETS Thousands/uL 186         Results from last 7 days   Lab Units 09/06/22  0445 09/05/22  0526 09/04/22  0521 09/02/22  1812   POTASSIUM mmol/L 4 4 4 2 4 2 4 1   CHLORIDE mmol/L 103 102 100 101   CO2 mmol/L 29 28 29 30   BUN mg/dL 14 23 22 13   CREATININE mg/dL 0 74 0 71 0 73 0 86   CALCIUM mg/dL 9 2 8 8 8 8 9 1   ALK PHOS U/L  --   --   --  79   ALT U/L  --   --   --  18   AST U/L  --   --   --  13               Vitals: Blood pressure 133/89, pulse 80, temperature 98 3 °F (36 8 °C), resp  rate 19, height 5' 1" (1 549 m), weight 72 6 kg (160 lb), SpO2 94 %  , Body mass index is 30 23 kg/m² ,   Orthostatic Blood Pressures    Flowsheet Row Most Recent Value   Blood Pressure 133/89 filed at 09/06/2022 1353   Patient Position - Orthostatic VS Sitting filed at 09/02/2022 2841          Systolic (86PPY), VGO:819 , Min:111 , FEF:044     Diastolic (10WRW), BNT:18, Min:87, Max:89        Intake/Output Summary (Last 24 hours) at 9/6/2022 1637  Last data filed at 9/6/2022 0510  Gross per 24 hour   Intake 300 ml   Output 1300 ml   Net -1000 ml       Invasive Devices  Report    None                       Telemetry:  Telemetry Orders (From admission, onward)             48 Hour Telemetry Monitoring  Continuous x 48 hours        Expiring   References:    Telemetry Guidelines   Question:  Reason for 48 Hour Telemetry  Answer:  Acute MI, chest pain - R/O MI, or unstable angina                 Telemetry Reviewed: Normal Sinus Rhythm  Indication for Continued Telemetry Use: No indication for continued use  Will discontinue       BP Readings from Last 3 Encounters:   09/06/22 133/89   03/18/20 110/78   11/27/18 (!) 176/74      Wt Readings from Last 3 Encounters:   09/06/22 72 6 kg (160 lb)   03/18/20 72 kg (158 lb 12 8 oz)   11/27/18 74 4 kg (164 lb)

## 2022-09-06 NOTE — ASSESSMENT & PLAN NOTE
· Patient reports on/off substernal chest pain w/o radiation for the past several days  Denies exertional or pleuritic component to the pain     · Troponin noted to be elevated at approx 60  · EKG w/o signs of ischemia  · Cardiology was consulted, completed stress echo since she declined nuclear stress test  · Follow up results of stress echo

## 2022-09-06 NOTE — PLAN OF CARE
Problem: PAIN - ADULT  Goal: Verbalizes/displays adequate comfort level or baseline comfort level  Description: Interventions:  - Encourage patient to monitor pain and request assistance  - Assess pain using appropriate pain scale  - Administer analgesics based on type and severity of pain and evaluate response  - Implement non-pharmacological measures as appropriate and evaluate response  - Consider cultural and social influences on pain and pain management  - Notify physician/advanced practitioner if interventions unsuccessful or patient reports new pain  Outcome: Progressing     Problem: SAFETY ADULT  Goal: Patient will remain free of falls  Description: INTERVENTIONS:  - Educate patient/family on patient safety including physical limitations  - Instruct patient to call for assistance with activity   - Consult OT/PT to assist with strengthening/mobility   - Keep Call bell within reach  - Keep bed low and locked with side rails adjusted as appropriate  - Keep care items and personal belongings within reach  - Initiate and maintain comfort rounds  - Make Fall Risk Sign visible to staff  - Offer Toileting every 2 Hours, in advance of need  - Initiate/Maintain alarm  - Obtain necessary fall risk management equipment  - Apply yellow socks and bracelet for high fall risk patients  - Consider moving patient to room near nurses station  Outcome: Progressing     Problem: DISCHARGE PLANNING  Goal: Discharge to home or other facility with appropriate resources  Description: INTERVENTIONS:  - Identify barriers to discharge w/patient and caregiver  - Arrange for needed discharge resources and transportation as appropriate  - Identify discharge learning needs (meds, wound care, etc )  - Arrange for interpretive services to assist at discharge as needed  - Refer to Case Management Department for coordinating discharge planning if the patient needs post-hospital services based on physician/advanced practitioner order or complex needs related to functional status, cognitive ability, or social support system  Outcome: Progressing     Problem: Knowledge Deficit  Goal: Patient/family/caregiver demonstrates understanding of disease process, treatment plan, medications, and discharge instructions  Description: Complete learning assessment and assess knowledge base    Interventions:  - Provide teaching at level of understanding  - Provide teaching via preferred learning methods  Outcome: Progressing     Problem: METABOLIC, FLUID AND ELECTROLYTES - ADULT  Goal: Glucose maintained within target range  Description: INTERVENTIONS:  - Monitor Blood Glucose as ordered  - Assess for signs and symptoms of hyperglycemia and hypoglycemia  - Administer ordered medications to maintain glucose within target range  - Assess nutritional intake and initiate nutrition service referral as needed  Outcome: Progressing     Problem: INFECTION - ADULT  Goal: Absence or prevention of progression during hospitalization  Description: INTERVENTIONS:  - Assess and monitor for signs and symptoms of infection  - Monitor lab/diagnostic results  - Monitor all insertion sites, i e  indwelling lines, tubes, and drains  - Monitor endotracheal if appropriate and nasal secretions for changes in amount and color  - [place] appropriate cooling/warming therapies per order  - Administer medications as ordered  - Instruct and encourage patient and family to use good hand hygiene technique  - Identify and instruct in appropriate isolation precautions for identified infection/condition  Outcome: Progressing

## 2022-09-06 NOTE — ASSESSMENT & PLAN NOTE
Lab Results   Component Value Date    HGBA1C 7 2 (H) 06/03/2022     · Hold home metformin  · [employer]  · Hypoglycemia protocol  · Diabetic diet   · Controlled

## 2022-09-08 LAB
BACTERIA BLD CULT: NORMAL
BACTERIA BLD CULT: NORMAL

## 2022-09-08 NOTE — CASE MANAGEMENT
Case Management Assessment & Discharge Planning Note    Patient name Elmer Ruelas  Location [address] [id number]/-[id number] MRN [id number]  : 1950 Date 2022       Current Admission Date: 2022  Current Admission Diagnosis:Chest pain   Patient Active Problem List    Diagnosis Date Noted    Hypertension     Diabetes mellitus ([facility] [address] )     Chest pain     Pain, dental     Paresthesia       LOS (days): 3  Geometric Mean LOS (GMLOS) (days):   Days to GMLOS:     OBJECTIVE:    Risk of Unplanned Readmission Score: 9 16         Current admission status: Inpatient       Preferred Pharmacy:   [facility] [address] [facility], [address] [address]  Phone: [phone] Fax: [phone]    Primary Care Provider: YAMILE Olivas    Primary Insurance: MEDICARE  Secondary Insurance:     LATE NOTE FOR 2022:    ASSESSMENT:  [address] Proxies    There are no active Health Care Proxies on file  Advance Directives  Does patient have a [address]?: Yes  Does patient have Advance Directives?: Yes  Advance Directives: Living will  Primary Contact: Mark Carty (Spouse)    [phone] (Home Phone)         Readmission Root Cause  30 Day Readmission: No    Patient Information  Admitted from[de-identified] Home  Mental Status: Alert, Other (Comment) (oriented)  During Assessment patient was accompanied by: Not accompanied during assessment  Assessment information provided by[de-identified] Patient  Primary Caregiver: Self  Support Systems: Spouse/significant other, [address] of Residence: [place] [address] do you live in?: [place], [address] [place] [address] Street entry access options   Select all that apply : Stairs  Number of steps to enter home : 2  Do the steps have railings?: No  Type of Current Residence: [place]  In the last 12 months, was there a time when you were not able to pay the mortgage or rent on time?: No  In the last 12 months, how many places have you lived?: 1  In the last 12 months, was there a time when you did not have a steady place to sleep or slept in a shelter (including now)?: No  Homeless/housing insecurity resource given?: N/A  Living Arrangements: Lives w/ Spouse/significant other  Is patient a ?: No    Activities of Daily Living Prior to Admission  Functional Status: Independent  Completes ADLs independently?: Yes  Ambulates independently?: Yes  Does patient use assisted devices?: No  Does patient currently own DME?: Yes  What DME does the patient currently own?: Tamanna Cinthya Myles  Does patient have a history of Outpatient Therapy (PT/OT)?: No  Does the patient have a history of Short-Term Rehab?: No  Does patient have a history of HHC?: No  Does patient currently have [address]?: No         Patient Information Continued  Does patient have prescription coverage?: No (changing plans at this time;  needs to make some calls to confirm coverage)  Within the past 12 months, you worried that your food would run out before you got the money to buy more : Never true  Within the past 12 months, the food you bought just didn't last and you didn't have money to get more : Never true  Food insecurity resource given?: N/A  Does patient receive dialysis treatments?: No  Does patient have a history of substance abuse?: No  Does patient have a history of Mental Health Diagnosis?: No         Means of Transportation  Means of Transport to Appts[de-identified] Drives Self  In the past 12 months, has lack of transportation kept you from medical appointments or from getting medications?: No  In the past 12 months, has lack of transportation kept you from meetings, work, or from getting things needed for daily living?: No  Was application for public transport provided?: N/A        DISCHARGE DETAILS:    Discharge planning discussed with[de-identified] met w pt at bedside  Freedom of Choice: Yes  Comments - Freedom of Choice: Pt feels she will have no post d/c needs    Requesting pain meds upon d/c d/t tooth pain   MD made aware of request   CM contacted family/caregiver?: No- see comments  Were Treatment Team discharge recommendations reviewed with patient/caregiver?: No (none at this time)               [address]         Is the patient interested in [facility] at discharge?: No    DME Referral Provided  Referral made for DME?: No    Other Referral/Resources/Interventions Provided:  Referral Comments: Pt feels she will have no post d/c needs  Requesting pain meds upon d/c d/t tooth pain    MD made aware of request          Treatment Team Recommendation: Home  Discharge Destination Plan[de-identified] Home  Transport at Discharge : Family

## 2022-09-12 NOTE — ASSESSMENT & PLAN NOTE
· Reporting left sided dental pain   · Has seen outpatient dentis who prescribed PO abx w/ concern for developing abscess  · Reports current left mandibular pain at this time  · Minimal left mandibular swelling, w/o erythema, and w/minimal pain on palpation appreciated at this time  · Complete antibiotic course at home  · Discharged with pain meds

## 2022-09-12 NOTE — DISCHARGE SUMMARY
[address] [address]  Discharge- [facility] 1950, 66 y o  female MRN: [id number]  Unit/Bed#: -01 Encounter: [id number]  Primary Care Provider: YAMILE Rodríguez   Date and time admitted to hospital: 9/2/2022  5:49 PM    * Chest pain  Assessment & Plan  · Patient reports on/off substernal chest pain w/o radiation for the past several days  Denies exertional or pleuritic component to the pain  · Troponin noted to be elevated at approx 60  · EKG w/o signs of ischemia  · Cardiology was consulted, completed stress echo since she declined nuclear stress test  · Stress echo negative, stable for discharge     Pain, dental  Assessment & Plan  · Reporting left sided dental pain   · Has seen outpatient dentis who prescribed PO abx w/ concern for developing abscess  · Reports current left mandibular pain at this time  · Minimal left mandibular swelling, w/o erythema, and w/minimal pain on palpation appreciated at this time  · Complete antibiotic course at home  · Discharged with pain meds        Medical Problems             Resolved Problems  Date Reviewed: 9/5/2022   None               Discharging Physician / Practitioner: Lucy Funez MD  PCP: Veronica Hernandez, [facility]  Admission Date:   Admission Orders (From admission, onward)     Ordered        09/03/22 1427  Inpatient Admission  Once            09/02/22 1943  Place in Observation  Once                      Discharge Date: 9/6/22    Consultations During Hospital Stay:  · IP CONSULT TO CARDIOLOGY  ·     Procedures Performed:   · imaging    Significant Findings / Test Results:   Principal Problem:    Chest pain  Active Problems:    Hypertension    Diabetes mellitus ([address] )    Pain, dental  Resolved Problems:    * No resolved hospital problems  *  ·   ·     Incidental Findings:   · See above      Test Results Pending at Discharge (will require follow up):    · Na      Outpatient Tests Requested:  · Follow up with PCP Complications:  None     Reason for Admission: chest pain     Hospital Course:   Myla Escalante is a 66 y o  female patient who originally presented to the hospital on 9/2/2022 due to chest pain, status post negative stress echo now stable for discharge  Condition at Discharge: good    Discharge Day Visit / Exam:   * Please refer to separate progress note for these details *    Discussion with Family: Patient declined call to   Discharge instructions/Information to patient and family:   See after visit summary for information provided to patient and family  Provisions for Follow-Up Care:  See after visit summary for information related to follow-up care and any pertinent home health orders  Disposition:   Home    Planned Readmission: none      Discharge Statement:  I spent 30+ minutes discharging the patient  This time was spent on the day of discharge  I had direct contact with the patient on the day of discharge  Greater than 50% of the total time was spent examining patient, answering all patient questions, arranging and discussing plan of care with patient as well as directly providing post-discharge instructions  Additional time then spent on discharge activities  Discharge Medications:  See after visit summary for reconciled discharge medications provided to patient and/or family        **Please Note: This note may have been constructed using a voice recognition system**

## 2022-09-12 NOTE — ASSESSMENT & PLAN NOTE
· Patient reports on/off substernal chest pain w/o radiation for the past several days  Denies exertional or pleuritic component to the pain     · Troponin noted to be elevated at approx 60  · EKG w/o signs of ischemia  · Cardiology was consulted, completed stress echo since she declined nuclear stress test  · Stress echo negative, stable for discharge

## 2024-06-12 ENCOUNTER — TELEPHONE (OUTPATIENT)
Age: 74
End: 2024-06-12

## 2024-08-21 ENCOUNTER — TELEPHONE (OUTPATIENT)
Age: 74
End: 2024-08-21

## 2024-08-21 NOTE — TELEPHONE ENCOUNTER
[facility] calling because pt advise them she had a colonoscopy with us. They were looking for when pt would be due again. Advised pt has not had any procedures with us

## 2025-03-08 ENCOUNTER — HOSPITAL ENCOUNTER (EMERGENCY)
Facility: HOSPITAL | Age: 75
Discharge: HOME | End: 2025-03-08
Payer: MEDICARE

## 2025-03-08 VITALS
RESPIRATION RATE: 18 BRPM | SYSTOLIC BLOOD PRESSURE: 204 MMHG | HEIGHT: 62 IN | WEIGHT: 164 LBS | OXYGEN SATURATION: 100 % | BODY MASS INDEX: 30.18 KG/M2 | HEART RATE: 81 BPM | DIASTOLIC BLOOD PRESSURE: 89 MMHG | TEMPERATURE: 98.6 F

## 2025-03-08 DIAGNOSIS — N30.91 HEMORRHAGIC CYSTITIS: Primary | ICD-10-CM

## 2025-03-08 LAB
BACTERIA UR QL AUTO: ABNORMAL /HPF
BILIRUB UR QL STRIP: NEGATIVE
CLARITY UR: ABNORMAL
COLOR UR: ABNORMAL
GLUCOSE UR STRIP-MCNC: ABNORMAL MG/DL
HGB UR QL STRIP.AUTO: ABNORMAL
KETONES UR STRIP-MCNC: NEGATIVE MG/DL
LEUKOCYTE ESTERASE UR QL STRIP: ABNORMAL
NITRITE UR QL STRIP: NEGATIVE
NON-SQ EPI CELLS URNS QL MICRO: ABNORMAL /HPF
PH UR STRIP.AUTO: 6 [PH]
PROT UR STRIP-MCNC: ABNORMAL MG/DL
RBC #/AREA URNS AUTO: ABNORMAL /HPF
SP GR UR STRIP.AUTO: 1.01 (ref 1–1.03)
UROBILINOGEN UR STRIP-ACNC: <2 MG/DL
WBC #/AREA URNS AUTO: ABNORMAL /HPF

## 2025-03-08 PROCEDURE — 87077 CULTURE AEROBIC IDENTIFY: CPT | Performed by: NURSE PRACTITIONER

## 2025-03-08 PROCEDURE — 87086 URINE CULTURE/COLONY COUNT: CPT | Performed by: NURSE PRACTITIONER

## 2025-03-08 PROCEDURE — 99283 EMERGENCY DEPT VISIT LOW MDM: CPT

## 2025-03-08 PROCEDURE — 81001 URINALYSIS AUTO W/SCOPE: CPT | Performed by: NURSE PRACTITIONER

## 2025-03-08 PROCEDURE — 87186 SC STD MICRODIL/AGAR DIL: CPT | Performed by: NURSE PRACTITIONER

## 2025-03-08 PROCEDURE — 99284 EMERGENCY DEPT VISIT MOD MDM: CPT | Performed by: NURSE PRACTITIONER

## 2025-03-08 RX ORDER — CEPHALEXIN 500 MG/1
500 CAPSULE ORAL EVERY 8 HOURS SCHEDULED
Qty: 30 CAPSULE | Refills: 0 | Status: SHIPPED | OUTPATIENT
Start: 2025-03-08 | End: 2025-03-18

## 2025-03-08 RX ADMIN — CEPHALEXIN 500 MG: 250 CAPSULE ORAL at 11:10

## 2025-03-08 NOTE — ED PROVIDER NOTES
Time reflects when diagnosis was documented in both MDM as applicable and the Disposition within this note       Time User Action Codes Description Comment    3/8/2025 10:48 AM Julio C Bowden Add [N30.90] Cystitis     3/8/2025 10:48 AM Julio C Bowden Remove [N30.90] Cystitis     3/8/2025 10:48 AM Julio C Bowden Add [N30.91] Hemorrhagic cystitis           ED Disposition       ED Disposition   Discharge    Condition   Stable    Date/Time   Sat Mar 8, 2025 10:48 AM    Comment   Aviva Carloz discharge to home/self care.                   Assessment & Plan       Medical Decision Making  Urinalysis consistent with urinary tract infection.  No physical exam findings or history that would suggest pyelonephritis.  Patient does not have any pain associated with this doubt obstructive uropathy.  Begin antibiotics at home return precautions discussed.    Amount and/or Complexity of Data Reviewed  Labs: ordered.    Risk  Prescription drug management.             Medications   cephalexin (KEFLEX) capsule 500 mg (500 mg Oral Given 3/8/25 1110)       ED Risk Strat Scores                    Identification of Seniors at Risk      Flowsheet Row Most Recent Value   (ISAR) Identification of Seniors at Risk    Before the illness or injury that brought you to the Emergency, did you need someone to help you on a regular basis? 0 Filed at: 03/08/2025 0958   In the last 24 hours, have you needed more help than usual? 0 Filed at: 03/08/2025 0958   Have you been hospitalized for one or more nights during the past 6 months? 0 Filed at: 03/08/2025 0958   In general, do you see well? 0 Filed at: 03/08/2025 0958   In general, do you have serious problems with your memory? 0 Filed at: 03/08/2025 0958   Do you take more than three different medications every day? 0 Filed at: 03/08/2025 0958   ISAR Score 0 Filed at: 03/08/2025 0958                                    History of Present Illness       Chief Complaint   Patient presents with    Possible UTI      C/o burning, frequency and blood in urine since this morning.        Past Medical History:   Diagnosis Date    Diabetes mellitus (HCC)     Hypertension       History reviewed. No pertinent surgical history.   History reviewed. No pertinent family history.   Social History     Tobacco Use    Smoking status: Former     Current packs/day: 0.00     Types: Cigarettes     Quit date: 1995     Years since quittin.2    Smokeless tobacco: Never   Vaping Use    Vaping status: Never Used   Substance Use Topics    Alcohol use: Yes     Alcohol/week: 1.0 standard drink of alcohol     Types: 1 Glasses of wine per week    Drug use: Never      E-Cigarette/Vaping    E-Cigarette Use Never User       E-Cigarette/Vaping Substances    Nicotine No     THC No     CBD No     Flavoring No     Other No     Unknown No       I have reviewed and agree with the history as documented.     74-year-old female presenting here today with a chief complaint of hematuria.  She reports her symptoms started this morning.  She denies any fever chills or back pain.  No history of kidney stones.  Will evaluate her for urinary tract infection doubt obstructive uropathy in the absence of pain          Review of Systems   Constitutional:  Negative for diaphoresis, fatigue and fever.   HENT:  Negative for congestion, ear pain, nosebleeds and sore throat.    Eyes:  Negative for photophobia, pain, discharge and visual disturbance.   Respiratory:  Negative for cough, choking, chest tightness, shortness of breath and wheezing.    Cardiovascular:  Negative for chest pain and palpitations.   Gastrointestinal:  Negative for abdominal distention, abdominal pain, diarrhea and vomiting.   Genitourinary:  Positive for frequency and hematuria. Negative for dysuria and flank pain.   Musculoskeletal:  Negative for back pain, gait problem and joint swelling.   Skin:  Negative for color change and rash.   Neurological:  Negative for dizziness, syncope and  headaches.   Psychiatric/Behavioral:  Negative for behavioral problems and confusion. The patient is not nervous/anxious.    All other systems reviewed and are negative.          Objective       ED Triage Vitals [03/08/25 0956]   Temperature Pulse Blood Pressure Respirations SpO2 Patient Position - Orthostatic VS   98.6 °F (37 °C) 81 (!) 204/89 18 100 % Sitting      Temp Source Heart Rate Source BP Location FiO2 (%) Pain Score    Temporal Monitor Left arm -- --      Vitals      Date and Time Temp Pulse SpO2 Resp BP Pain Score FACES Pain Rating User   03/08/25 0956 98.6 °F (37 °C) 81 100 % 18 204/89 -- -- [place]            Physical Exam  Vitals and nursing note reviewed.   Constitutional:       General: She is not in acute distress.     Appearance: She is well-developed. She is not ill-appearing or toxic-appearing.   HENT:      Head: Normocephalic and atraumatic.      Nose: No rhinorrhea.      Mouth/Throat:      Mouth: Mucous membranes are moist.      Dentition: Normal dentition.   Eyes:      General:         Right eye: No discharge.         Left eye: No discharge.   Cardiovascular:      Rate and Rhythm: Normal rate and regular rhythm.   Pulmonary:      Effort: Pulmonary effort is normal. No accessory muscle usage or respiratory distress.   Abdominal:      General: There is no distension.      Tenderness: There is no right CVA tenderness, left CVA tenderness or guarding.   Musculoskeletal:         General: Normal range of motion.      Cervical back: Normal range of motion and neck supple. No rigidity.   Skin:     General: Skin is warm and dry.   Neurological:      Mental Status: She is alert and oriented to person, place, and time.      Coordination: Coordination normal.   Psychiatric:         Behavior: Behavior is cooperative.         Results Reviewed       Procedure Component Value Units Date/Time    Urine Microscopic [[id number]]  (Abnormal) Collected: 03/08/25 1024    Lab Status: Final result Specimen: Urine, Other  Updated: 03/08/25 1036     RBC, UA Innumerable /hpf      WBC, UA Innumerable /hpf      Epithelial Cells Occasional /hpf      Bacteria, UA None Seen /hpf     Urine culture [[id number]] Collected: 03/08/25 1024    Lab Status: In process Specimen: Urine, Other Updated: 03/08/25 1036    UA w Reflex to Microscopic w Reflex to Culture [[id number]]  (Abnormal) Collected: 03/08/25 1024    Lab Status: Final result Specimen: Urine, Other Updated: 03/08/25 1032     Color, UA Light Orange     Clarity, UA Turbid     Specific Gravity, UA 1.012     pH, UA 6.0     Leukocytes, UA Large     Nitrite, UA Negative     Protein, UA 50 (1+) mg/dl      Glucose,  (1/10%) mg/dl      Ketones, UA Negative mg/dl      Urobilinogen, UA <2.0 mg/dl      Bilirubin, UA Negative     Occult Blood, UA Large            No orders to display       Procedures    ED Medication and Procedure Management   Prior to Admission Medications   Prescriptions Last Dose Informant Patient Reported? Taking?   Cetirizine HCl 10 MG CAPS  Self Yes No   Sig: Take 10 mg by mouth daily   Patient not taking: Reported on 9/3/2022   desonide (DESOWEN) 0.05 % ointment  Self No No   Sig: Apply topically 2 (two) times a day   Patient not taking: Reported on 9/3/2022   lisinopril (ZESTRIL) 5 mg tablet  Self Yes No   metFORMIN (GLUCOPHAGE) 500 mg tablet  Self Yes No   Sig: Take 500 mg by mouth      Facility-Administered Medications: None     Discharge Medication List as of 3/8/2025 10:49 AM        START taking these medications    Details   cephalexin (KEFLEX) 500 mg capsule Take 1 capsule (500 mg total) by mouth every 8 (eight) hours for 10 days, Starting Sat 3/8/2025, Until Tue 3/18/2025, Normal           CONTINUE these medications which have NOT CHANGED    Details   Cetirizine HCl 10 MG CAPS Take 10 mg by mouth daily, Starting Mon 1/20/2020, Historical Med      desonide (DESOWEN) 0.05 % ointment Apply topically 2 (two) times a day, Starting Thu 2/6/2020, Normal      lisinopril  (ZESTRIL) 5 mg tablet Starting Mon 1/20/2020, Historical Med      metFORMIN (GLUCOPHAGE) 500 mg tablet Take 500 mg by mouth, Starting Mon 1/20/2020, Historical Med           No discharge procedures on file.  ED SEPSIS DOCUMENTATION   Time reflects when diagnosis was documented in both MDM as applicable and the Disposition within this note       Time User Action Codes Description Comment    3/8/2025 10:48 AM Julio C Bowden Add [N30.90] Cystitis     3/8/2025 10:48 AM Julio C Bowden Remove [N30.90] Cystitis     3/8/2025 10:48 AM Julio C Bowden Add [N30.91] Hemorrhagic cystitis                  YAMILE Hope  03/08/25 1344

## 2025-03-10 ENCOUNTER — RESULTS FOLLOW-UP (OUTPATIENT)
Dept: EMERGENCY DEPT | Facility: HOSPITAL | Age: 75
End: 2025-03-10

## 2025-03-11 LAB — BACTERIA UR CULT: ABNORMAL
